# Patient Record
Sex: MALE | Race: WHITE | NOT HISPANIC OR LATINO | Employment: UNEMPLOYED | URBAN - METROPOLITAN AREA
[De-identification: names, ages, dates, MRNs, and addresses within clinical notes are randomized per-mention and may not be internally consistent; named-entity substitution may affect disease eponyms.]

---
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
## 2021-09-18 ENCOUNTER — OFFICE VISIT (OUTPATIENT)
Dept: URGENT CARE | Facility: MEDICAL CENTER | Age: 21
End: 2021-09-18
Payer: COMMERCIAL

## 2021-09-18 VITALS
HEART RATE: 80 BPM | BODY MASS INDEX: 20.16 KG/M2 | DIASTOLIC BLOOD PRESSURE: 69 MMHG | WEIGHT: 144 LBS | HEIGHT: 71 IN | RESPIRATION RATE: 18 BRPM | SYSTOLIC BLOOD PRESSURE: 129 MMHG | OXYGEN SATURATION: 97 % | TEMPERATURE: 98.4 F

## 2021-09-18 DIAGNOSIS — L23.7 POISON IVY: Primary | ICD-10-CM

## 2021-09-18 PROCEDURE — 99213 OFFICE O/P EST LOW 20 MIN: CPT | Performed by: PHYSICIAN ASSISTANT

## 2021-09-18 RX ORDER — PREDNISONE 20 MG/1
40 TABLET ORAL DAILY
Qty: 10 TABLET | Refills: 0 | Status: SHIPPED | OUTPATIENT
Start: 2021-09-18 | End: 2021-09-23

## 2021-09-18 NOTE — PROGRESS NOTES
[facility]        NAME: Aster Wheat is a 22 y o  male  : 2000    MRN: [id number]  DATE: 2021  TIME: 7:30 PM    Assessment and Plan   Poison ivy [L23 7]  1  Poison ivy  predniSONE 20 mg tablet         Patient Instructions     Poison ivy  Prednisone 40 mg daily x 5 days  Follow up with PCP in 3-5 days  Proceed to  ER if symptoms worsen  Chief Complaint     Chief Complaint   Patient presents with    Rash     Began two weeks ago         History of Present Illness       23 y/o male presents c/o itchy rash to upper extremities after working on the yard  States he has tried over the counter medications with no relief      Review of Systems   Review of Systems   Constitutional: Negative  HENT: Negative  Eyes: Negative  Respiratory: Negative  Negative for apnea, cough, choking, chest tightness, shortness of breath, wheezing and stridor  Cardiovascular: Negative  Negative for chest pain  Skin: Positive for rash           Current Medications       Current Outpatient Medications:     citalopram (CeleXA) 10 mg tablet, Take 1 tablet (10 mg total) by mouth daily, Disp: 90 tablet, Rfl: 0    LORazepam (ATIVAN) 0 5 mg tablet, Take 1 tablet (0 5 mg total) by mouth 2 (two) times a day as needed for anxiety, Disp: 30 tablet, Rfl: 0    OLANZapine (ZyPREXA) 2 5 mg tablet, , Disp: , Rfl: 0    ondansetron (ZOFRAN-ODT) 4 mg disintegrating tablet, Take 1 tablet (4 mg total) by mouth every 6 (six) hours as needed for nausea or vomiting, Disp: 20 tablet, Rfl: 0    predniSONE 20 mg tablet, Take 2 tablets (40 mg total) by mouth daily for 5 days, Disp: 10 tablet, Rfl: 0    Current Allergies     Allergies as of 2021    (No Known Allergies)            The following portions of the patient's history were reviewed and updated as appropriate: allergies, current medications, past family history, past medical history, past social history, past surgical history and problem list      Past Medical History:   Diagnosis Date    No known health problems        Past Surgical History:   Procedure Laterality Date    CYST REMOVAL Bilateral     on tailbone       Family History   Problem Relation Age of Onset    Breast cancer Family     Cancer Family         Malignant neoplasm of Gastrointestinal tract    Kidney cancer Family          Medications have been verified  Objective   /69   Pulse 80   Temp 98 4 °F (36 9 °C)   Resp 18   Ht 5' 11" (1 803 m)   Wt 65 3 kg (144 lb)   SpO2 97%   BMI 20 08 kg/m²        Physical Exam     Physical Exam  Constitutional:       General: He is not in acute distress  Appearance: He is well-developed  He is not diaphoretic  Cardiovascular:      Rate and Rhythm: Normal rate and regular rhythm  Heart sounds: Normal heart sounds  Pulmonary:      Effort: Pulmonary effort is normal  No respiratory distress  Breath sounds: Normal breath sounds  No wheezing or rales  Chest:      Chest wall: No tenderness  Musculoskeletal:      Cervical back: Normal range of motion and neck supple  Lymphadenopathy:      Cervical: No cervical adenopathy     Skin:

## 2021-10-18 PROCEDURE — U0005 INFEC AGEN DETEC AMPLI PROBE: HCPCS | Performed by: FAMILY MEDICINE

## 2021-10-18 PROCEDURE — U0003 INFECTIOUS AGENT DETECTION BY NUCLEIC ACID (DNA OR RNA); SEVERE ACUTE RESPIRATORY SYNDROME CORONAVIRUS 2 (SARS-COV-2) (CORONAVIRUS DISEASE [COVID-19]), AMPLIFIED PROBE TECHNIQUE, MAKING USE OF HIGH THROUGHPUT TECHNOLOGIES AS DESCRIBED BY CMS-2020-01-R: HCPCS | Performed by: FAMILY MEDICINE

## 2021-10-20 ENCOUNTER — TELEMEDICINE (OUTPATIENT)
Dept: FAMILY MEDICINE CLINIC | Facility: CLINIC | Age: 21
End: 2021-10-20
Payer: COMMERCIAL

## 2021-10-20 DIAGNOSIS — U07.1 COVID-19: Primary | ICD-10-CM

## 2021-10-20 PROCEDURE — 1036F TOBACCO NON-USER: CPT | Performed by: FAMILY MEDICINE

## 2021-10-20 PROCEDURE — 99213 OFFICE O/P EST LOW 20 MIN: CPT | Performed by: FAMILY MEDICINE

## 2022-09-13 ENCOUNTER — APPOINTMENT (OUTPATIENT)
Dept: LAB | Facility: MEDICAL CENTER | Age: 22
End: 2022-09-13
Payer: MEDICARE

## 2023-01-12 ENCOUNTER — OFFICE VISIT (OUTPATIENT)
Dept: FAMILY MEDICINE CLINIC | Facility: CLINIC | Age: 23
End: 2023-01-12

## 2023-01-12 VITALS
BODY MASS INDEX: 18.15 KG/M2 | OXYGEN SATURATION: 99 % | WEIGHT: 129.6 LBS | SYSTOLIC BLOOD PRESSURE: 130 MMHG | TEMPERATURE: 97.6 F | HEIGHT: 71 IN | HEART RATE: 87 BPM | DIASTOLIC BLOOD PRESSURE: 78 MMHG

## 2023-01-12 DIAGNOSIS — N39.0 URINARY TRACT INFECTION WITHOUT HEMATURIA, SITE UNSPECIFIED: ICD-10-CM

## 2023-01-12 DIAGNOSIS — R30.9 PAINFUL URINATION: Primary | ICD-10-CM

## 2023-01-12 LAB
SL AMB  POCT GLUCOSE, UA: NORMAL
SL AMB LEUKOCYTE ESTERASE,UA: NORMAL
SL AMB POCT BILIRUBIN,UA: NORMAL
SL AMB POCT BLOOD,UA: NORMAL
SL AMB POCT CLARITY,UA: CLEAR
SL AMB POCT COLOR,UA: NORMAL
SL AMB POCT KETONES,UA: NORMAL
SL AMB POCT NITRITE,UA: NORMAL
SL AMB POCT PH,UA: 6
SL AMB POCT SPECIFIC GRAVITY,UA: 1.01
SL AMB POCT URINE PROTEIN: NORMAL
SL AMB POCT UROBILINOGEN: NORMAL

## 2023-01-12 RX ORDER — CIPROFLOXACIN 500 MG/1
500 TABLET, FILM COATED ORAL EVERY 12 HOURS SCHEDULED
Qty: 14 TABLET | Refills: 0 | Status: SHIPPED | OUTPATIENT
Start: 2023-01-12 | End: 2023-01-19

## 2023-01-12 NOTE — PATIENT INSTRUCTIONS
Consider kidney stones, UTI/STD, bladder pathology, sacral issue with new incontinence     Consider scrotal US, pelvic CT, CT stone search

## 2023-01-12 NOTE — PROGRESS NOTES
Assessment/Plan:    1  Painful urination  -     POCT urine dip  -     Urine culture; Future  -     Chlamydia/GC amplified DNA by PCR  -     ciprofloxacin (CIPRO) 500 mg tablet; Take 1 tablet (500 mg total) by mouth every 12 (twelve) hours for 7 days  -     Urine culture          Patient Instructions   Consider kidney stones, UTI/STD, bladder pathology, sacral issue with new incontinence  Consider scrotal US, pelvic CT, CT stone search      No follow-ups on file  Subjective:      Patient ID: Shannon Ray is a 25 y o  male  Chief Complaint   Patient presents with   • Difficulty Urinating       Here for difficulty and painful urination for the last month  He states he was having some incontinent dribbling when he was bending over at work for at least 2 months before the pain and difficulty started  Pain is in his scrotum and tip of the penis, it comes and goes but does not always coincide with urination  Also c/o flank pain bilaterally  Denies unprotected sex or high risk behaviors some low risk but he is a virgin  Denies any SOB, CP  Denies any blood in urine or stool  No family history of kidney stones but dad and brother have autoimmune pancreatitis  No treatments to alleviate symptoms besides gingerale with cranberry  Denies any prolonged hours of not urinating or having to hold it  Denies any long trips  States if he has to go he goes, drinks adequate fluids  The following portions of the patient's history were reviewed and updated as appropriate:  past social history    Review of Systems   Constitutional: Positive for activity change  Negative for fatigue and fever  HENT: Negative  Negative for congestion  Eyes: Negative  Negative for visual disturbance  Respiratory: Negative for cough, chest tightness and shortness of breath  Cardiovascular: Negative for chest pain and palpitations  Gastrointestinal: Positive for abdominal pain (lower umbilcal area)   Negative for blood in stool, diarrhea, nausea and vomiting  Endocrine: Negative  Genitourinary: Positive for difficulty urinating, flank pain, frequency, penile pain and testicular pain  Negative for hematuria and urgency  Musculoskeletal: Negative for arthralgias  Skin: Negative  Allergic/Immunologic: Negative  Neurological: Negative for dizziness, syncope, weakness, numbness and headaches  Hematological: Negative  Does not bruise/bleed easily  Psychiatric/Behavioral: Negative  Current Outpatient Medications   Medication Sig Dispense Refill   • ciprofloxacin (CIPRO) 500 mg tablet Take 1 tablet (500 mg total) by mouth every 12 (twelve) hours for 7 days 14 tablet 0   • citalopram (CeleXA) 10 mg tablet Take 1 tablet (10 mg total) by mouth daily 90 tablet 0   • LORazepam (ATIVAN) 0 5 mg tablet Take 1 tablet (0 5 mg total) by mouth 2 (two) times a day as needed for anxiety 30 tablet 0   • OLANZapine (ZyPREXA) 2 5 mg tablet   0   • ondansetron (ZOFRAN-ODT) 4 mg disintegrating tablet Take 1 tablet (4 mg total) by mouth every 6 (six) hours as needed for nausea or vomiting 20 tablet 0     No current facility-administered medications for this visit  Objective:    /78 (BP Location: Right arm, Patient Position: Sitting, Cuff Size: Standard)   Pulse 87   Temp 97 6 °F (36 4 °C)   Ht 5' 11" (1 803 m)   Wt 58 8 kg (129 lb 9 6 oz)   SpO2 99%   BMI 18 08 kg/m²      Physical Exam  Vitals reviewed  Constitutional:       Appearance: Normal appearance  He is well-developed  He is not ill-appearing  Cardiovascular:      Rate and Rhythm: Normal rate and regular rhythm  Heart sounds: Normal heart sounds  Pulmonary:      Effort: Pulmonary effort is normal       Breath sounds: Normal breath sounds  Abdominal:      General: Abdomen is flat  Bowel sounds are normal  There is no distension  Palpations: Abdomen is soft  There is no mass  Tenderness:  There is abdominal tenderness (lower umbilical area (bladder))  There is no guarding  Hernia: No hernia is present  Skin:     General: Skin is warm and dry  Neurological:      General: No focal deficit present  Mental Status: He is alert and oriented to person, place, and time  Mental status is at baseline  Psychiatric:         Mood and Affect: Mood normal          Behavior: Behavior normal          Thought Content:  Thought content normal          Judgment: Judgment normal              Mike Rodriguez MD

## 2023-01-13 LAB
BACTERIA UR CULT: NORMAL
C TRACH DNA SPEC QL NAA+PROBE: NEGATIVE
N GONORRHOEA DNA SPEC QL NAA+PROBE: NEGATIVE

## 2023-02-17 ENCOUNTER — TELEMEDICINE (OUTPATIENT)
Dept: FAMILY MEDICINE CLINIC | Facility: CLINIC | Age: 23
End: 2023-02-17

## 2023-02-17 VITALS — TEMPERATURE: 98.5 F

## 2023-02-17 DIAGNOSIS — R11.2 NAUSEA AND VOMITING, UNSPECIFIED VOMITING TYPE: Primary | ICD-10-CM

## 2023-02-17 DIAGNOSIS — F41.1 GENERALIZED ANXIETY DISORDER: ICD-10-CM

## 2023-02-17 DIAGNOSIS — R19.7 DIARRHEA, UNSPECIFIED TYPE: ICD-10-CM

## 2023-02-17 PROBLEM — J06.9 VIRAL URI: Status: RESOLVED | Noted: 2018-12-05 | Resolved: 2023-02-17

## 2023-02-17 RX ORDER — FAMOTIDINE 20 MG/1
20 TABLET, FILM COATED ORAL 2 TIMES DAILY
Qty: 30 TABLET | Refills: 1 | Status: SHIPPED | OUTPATIENT
Start: 2023-02-17

## 2023-02-17 RX ORDER — ONDANSETRON 4 MG/1
4 TABLET, FILM COATED ORAL EVERY 8 HOURS PRN
Qty: 20 TABLET | Refills: 0 | Status: SHIPPED | OUTPATIENT
Start: 2023-02-17

## 2023-02-17 NOTE — LETTER
February 17, 2023     Patient: Zunilda Johnson  YOB: 2000  Date of Visit: 2/17/2023      To Whom it May Concern:    Rakesh Peña is under my professional care  Rakesh was seen in my office on 2/17/2023  Rakesh may return to work on 2/18/2023  If you have any questions or concerns, please don't hesitate to call           Sincerely,          Ismael Yu PA-C        CC: No Recipients

## 2023-02-17 NOTE — PROGRESS NOTES
Virtual Regular Visit    Verification of patient location:    Patient is located in the following state in which I hold an active license [place]      Assessment/Plan:    Problem List Items Addressed This Visit        Other    Generalized anxiety disorder   Other Visit Diagnoses     Nausea and vomiting, unspecified vomiting type    -  Primary    Zofran ordered  Also starting famotidine  Discontinue caffeine and nicotine  Relevant Medications    famotidine (PEPCID) 20 mg tablet    ondansetron (ZOFRAN) 4 mg tablet    Diarrhea, unspecified type        Suspect may be related to some IBS               Reason for visit is   Chief Complaint   Patient presents with   • Virtual Regular Visit        Encounter provider Abdon Hull PA-C    Provider located at [address] [place] [address] [facility] [id number] [phone]      Recent Visits  No visits were found meeting these conditions  Showing recent visits within past 7 days and meeting all other requirements  Today's Visits  Date Type Provider Dept   02/17/23 Telemedicine [facility]   Showing today's visits and meeting all other requirements  Future Appointments  No visits were found meeting these conditions  Showing future appointments within next 150 days and meeting all other requirements       The patient was identified by name and date of birth  Rakesh Peña was informed that this is a telemedicine visit and that the visit is being conducted through the [facility]  He agrees to proceed     My office door was closed  No one else was in the room  He acknowledged consent and understanding of privacy and security of the video platform  The patient has agreed to participate and understands they can discontinue the visit at any time  Patient is aware this is a billable service       Nando Alberts is a 25 y o  male       Patient being seen via video visit for recurrent nausea vomiting and diarrhea  States he has no fever or abdominal pain  Had these recurrent symptoms every 2 to 3 weeks for the last couple of months  She states she does smoke nicotine  Not drink alcohol  Does drink a lot of soda  Been trying to cut this down  Appetite is good and he has no weight loss  He has no black stool or blood in the stool  We will prescribe some Zofran for the as needed nausea and vomiting  I am going to start patient on famotidine  Patient may have some reflux  And then triggers some IBS symptoms  This persists he will need referral to gastroenterology       Past Medical History:   Diagnosis Date   • No known health problems        Past Surgical History:   Procedure Laterality Date   • CYST REMOVAL Bilateral     on tailbone       Current Outpatient Medications   Medication Sig Dispense Refill   • famotidine (PEPCID) 20 mg tablet Take 1 tablet (20 mg total) by mouth 2 (two) times a day 30 tablet 1   • ondansetron (ZOFRAN) 4 mg tablet Take 1 tablet (4 mg total) by mouth every 8 (eight) hours as needed for nausea or vomiting 20 tablet 0   • citalopram (CeleXA) 10 mg tablet Take 1 tablet (10 mg total) by mouth daily 90 tablet 0   • LORazepam (ATIVAN) 0 5 mg tablet Take 1 tablet (0 5 mg total) by mouth 2 (two) times a day as needed for anxiety 30 tablet 0   • OLANZapine (ZyPREXA) 2 5 mg tablet   0     No current facility-administered medications for this visit  No Known Allergies    Review of Systems   Constitutional: Negative for activity change, appetite change, fever and unexpected weight change  Gastrointestinal: Positive for diarrhea, nausea and vomiting  Negative for abdominal pain and blood in stool  Video Exam    Vitals:    02/17/23 1132   Temp: 98 5 °F (36 9 °C)       Physical Exam  Constitutional:       General: He is not in acute distress  Appearance: Normal appearance  He is not ill-appearing  HENT:      Head: Normocephalic and atraumatic  Right Ear: External ear normal       Left Ear: External ear normal    Eyes:      Conjunctiva/sclera: Conjunctivae normal    Pulmonary:      Effort: Pulmonary effort is normal    Skin:     Coloration: Skin is not pale  Findings: No erythema  Neurological:      Mental Status: He is alert            I spent 5 3 minutes directly with the patient during this visit

## 2023-03-31 ENCOUNTER — HOSPITAL ENCOUNTER (EMERGENCY)
Facility: HOSPITAL | Age: 23
Discharge: HOME | End: 2023-04-01
Attending: EMERGENCY MEDICINE

## 2023-03-31 ENCOUNTER — APPOINTMENT (EMERGENCY)
Dept: RADIOLOGY | Facility: HOSPITAL | Age: 23
End: 2023-03-31

## 2023-03-31 DIAGNOSIS — R07.89 COSTOCHONDRAL CHEST PAIN: Primary | ICD-10-CM

## 2023-03-31 DIAGNOSIS — F41.1 GENERALIZED ANXIETY DISORDER: ICD-10-CM

## 2023-03-31 DIAGNOSIS — F41.9 ANXIETY: ICD-10-CM

## 2023-03-31 DIAGNOSIS — E87.6 HYPOKALEMIA: ICD-10-CM

## 2023-03-31 DIAGNOSIS — F31.9 BIPOLAR AFFECTIVE DISORDER (HCC): ICD-10-CM

## 2023-03-31 LAB
ALBUMIN SERPL BCP-MCNC: 5 G/DL (ref 3.5–5)
ALP SERPL-CCNC: 45 U/L (ref 34–104)
ALT SERPL W P-5'-P-CCNC: 12 U/L (ref 7–52)
ANION GAP SERPL CALCULATED.3IONS-SCNC: 14 MMOL/L (ref 4–13)
AST SERPL W P-5'-P-CCNC: 14 U/L (ref 13–39)
BASOPHILS # BLD AUTO: 0.04 THOUSANDS/ÂΜL (ref 0–0.1)
BASOPHILS NFR BLD AUTO: 1 % (ref 0–1)
BILIRUB SERPL-MCNC: 0.71 MG/DL (ref 0.2–1)
BUN SERPL-MCNC: 14 MG/DL (ref 5–25)
CALCIUM SERPL-MCNC: 10 MG/DL (ref 8.4–10.2)
CHLORIDE SERPL-SCNC: 101 MMOL/L (ref 96–108)
CO2 SERPL-SCNC: 22 MMOL/L (ref 21–32)
CREAT SERPL-MCNC: 0.9 MG/DL (ref 0.6–1.3)
EOSINOPHIL # BLD AUTO: 0.04 THOUSAND/ÂΜL (ref 0–0.61)
EOSINOPHIL NFR BLD AUTO: 1 % (ref 0–6)
ERYTHROCYTE [DISTWIDTH] IN BLOOD BY AUTOMATED COUNT: 12.3 % (ref 11.6–15.1)
GFR SERPL CREATININE-BSD FRML MDRD: 121 ML/MIN/1.73SQ M
GLUCOSE SERPL-MCNC: 95 MG/DL (ref 65–140)
HCT VFR BLD AUTO: 41 % (ref 36.5–49.3)
HGB BLD-MCNC: 14.2 G/DL (ref 12–17)
IMM GRANULOCYTES # BLD AUTO: 0.03 THOUSAND/UL (ref 0–0.2)
IMM GRANULOCYTES NFR BLD AUTO: 0 % (ref 0–2)
LYMPHOCYTES # BLD AUTO: 2.21 THOUSANDS/ÂΜL (ref 0.6–4.47)
LYMPHOCYTES NFR BLD AUTO: 30 % (ref 14–44)
MCH RBC QN AUTO: 30 PG (ref 26.8–34.3)
MCHC RBC AUTO-ENTMCNC: 34.6 G/DL (ref 31.4–37.4)
MCV RBC AUTO: 87 FL (ref 82–98)
MONOCYTES # BLD AUTO: 0.6 THOUSAND/ÂΜL (ref 0.17–1.22)
MONOCYTES NFR BLD AUTO: 8 % (ref 4–12)
NEUTROPHILS # BLD AUTO: 4.54 THOUSANDS/ÂΜL (ref 1.85–7.62)
NEUTS SEG NFR BLD AUTO: 60 % (ref 43–75)
NRBC BLD AUTO-RTO: 0 /100 WBCS
PLATELET # BLD AUTO: 416 THOUSANDS/UL (ref 149–390)
PMV BLD AUTO: 9 FL (ref 8.9–12.7)
POTASSIUM SERPL-SCNC: 3.2 MMOL/L (ref 3.5–5.3)
PROT SERPL-MCNC: 8 G/DL (ref 6.4–8.4)
RBC # BLD AUTO: 4.73 MILLION/UL (ref 3.88–5.62)
SODIUM SERPL-SCNC: 137 MMOL/L (ref 135–147)
WBC # BLD AUTO: 7.46 THOUSAND/UL (ref 4.31–10.16)

## 2023-03-31 RX ORDER — KETOROLAC TROMETHAMINE 30 MG/ML
15 INJECTION, SOLUTION INTRAMUSCULAR; INTRAVENOUS ONCE
Status: COMPLETED | OUTPATIENT
Start: 2023-03-31 | End: 2023-03-31

## 2023-03-31 RX ORDER — ACETAMINOPHEN 325 MG/1
975 TABLET ORAL ONCE
Status: COMPLETED | OUTPATIENT
Start: 2023-03-31 | End: 2023-03-31

## 2023-03-31 RX ORDER — POTASSIUM CHLORIDE 20 MEQ/1
40 TABLET, EXTENDED RELEASE ORAL ONCE
Status: COMPLETED | OUTPATIENT
Start: 2023-03-31 | End: 2023-03-31

## 2023-03-31 RX ADMIN — ACETAMINOPHEN 975 MG: 325 TABLET ORAL at 23:55

## 2023-03-31 RX ADMIN — KETOROLAC TROMETHAMINE 15 MG: 30 INJECTION, SOLUTION INTRAMUSCULAR at 23:55

## 2023-03-31 RX ADMIN — POTASSIUM CHLORIDE 40 MEQ: 1500 TABLET, EXTENDED RELEASE ORAL at 23:55

## 2023-04-01 VITALS
BODY MASS INDEX: 20.04 KG/M2 | WEIGHT: 140 LBS | HEIGHT: 70 IN | TEMPERATURE: 97.8 F | RESPIRATION RATE: 20 BRPM | DIASTOLIC BLOOD PRESSURE: 67 MMHG | OXYGEN SATURATION: 100 % | HEART RATE: 70 BPM | SYSTOLIC BLOOD PRESSURE: 115 MMHG

## 2023-04-01 LAB
ATRIAL RATE: 92 BPM
CARDIAC TROPONIN I PNL SERPL HS: <2 NG/L
D DIMER PPP FEU-MCNC: <0.27 UG/ML FEU
P AXIS: 74 DEGREES
PR INTERVAL: 126 MS
QRS AXIS: 84 DEGREES
QRSD INTERVAL: 120 MS
QT INTERVAL: 362 MS
QTC INTERVAL: 447 MS
T WAVE AXIS: 76 DEGREES
VENTRICULAR RATE: 92 BPM

## 2023-04-01 RX ORDER — FLUOXETINE 20 MG/1
20 TABLET, FILM COATED ORAL DAILY
Qty: 14 TABLET | Refills: 0 | Status: SHIPPED | OUTPATIENT
Start: 2023-04-01 | End: 2023-04-15

## 2023-04-01 RX ORDER — OLANZAPINE 7.5 MG/1
7.5 TABLET ORAL
Qty: 14 TABLET | Refills: 0 | Status: SHIPPED | OUTPATIENT
Start: 2023-04-01 | End: 2023-04-15

## 2023-04-01 NOTE — ED PROVIDER NOTES
History  Chief Complaint   Patient presents with   • Shortness of Breath     Patient states he is having shortness of breath due to pain on deep inspiration due to chest pain  Patient quit nicotine a week ago, denies any cough  Patient is also facing an increased amount of stress recently  Patient states he is not taking his psych meds for the last two weeks due to inability to have his prescriptions refilled  Rakesh is a 21-year-old male with a history of bipolar affective disorder and generalized anxiety who presents with 5 days of substernal chest pain accompanied by difficulty taking a deep breath and exertional dyspnea  States this started without inciting incident, is waking him from sleep, chest pain is aching, non-radiating, worse with deep inspiration, currently 6/10  Had episode of nausea 2-3 days ago which has not returned after single dose zofran  Feels mildly anxious, however ran out of olanzapine and fluoxetine 2 weeks ago, is unable to get in touch with the [facility] clinic to get refills  Quit smoking 1 week ago after smoking for 5 years  No alcohol or recreational drug use  Denies fever, chills, weakness, lightheadedness, URI symptoms, cough, abdominal pain, vomiting diarrhea, constipation, urinary symptoms, peripheral edema, unilateral calf pain or swelling, rash, fatigue, SI/HI, A/V hallucinations, depressed mood, recent travel or sick contacts, or personal or family history of cardiac problems or DVT/PE  No history of asthma  Prior to Admission Medications   Prescriptions Last Dose Informant Patient Reported? Taking?    LORazepam (ATIVAN) 0 5 mg tablet   No No   Sig: Take 1 tablet (0 5 mg total) by mouth 2 (two) times a day as needed for anxiety   famotidine (PEPCID) 20 mg tablet   No No   Sig: Take 1 tablet (20 mg total) by mouth 2 (two) times a day   ondansetron (ZOFRAN) 4 mg tablet   No No   Sig: Take 1 tablet (4 mg total) by mouth every 8 (eight) hours as needed for nausea or vomiting      Facility-Administered Medications: None       Past Medical History:   Diagnosis Date   • No known health problems        Past Surgical History:   Procedure Laterality Date   • CYST REMOVAL Bilateral     on tailbone       Family History   Problem Relation Age of Onset   • Breast cancer Family    • Cancer Family         Malignant neoplasm of Gastrointestinal tract   • Kidney cancer Family      I have reviewed and agree with the history as documented  E-Cigarette/Vaping     E-Cigarette/Vaping Substances   • Nicotine No    • THC No    • CBD No    • Flavoring No    • Other No    • Unknown No      Social History     Tobacco Use   • Smoking status: Never   • Smokeless tobacco: Never   Substance Use Topics   • Alcohol use: No   • Drug use: No        Review of Systems   Constitutional: Negative  Negative for activity change, appetite change, chills, diaphoresis, fatigue, fever and unexpected weight change  HENT: Negative  Eyes: Negative  Negative for visual disturbance  Respiratory: Positive for chest tightness and shortness of breath  Negative for cough  Cardiovascular: Positive for chest pain  Negative for palpitations and leg swelling  Gastrointestinal: Negative  Negative for abdominal distention, abdominal pain, constipation, diarrhea, nausea and vomiting  Endocrine: Negative  Negative for cold intolerance, heat intolerance, polydipsia and polyuria  Genitourinary: Negative  Negative for decreased urine volume, dysuria, flank pain and hematuria  Musculoskeletal: Negative  Negative for arthralgias, back pain, myalgias and neck pain  Allergic/Immunologic: Negative  Negative for immunocompromised state  Neurological: Negative  Negative for dizziness, facial asymmetry, weakness, light-headedness and headaches  Hematological: Negative  Negative for adenopathy  Does not bruise/bleed easily     Psychiatric/Behavioral: Negative for agitation, confusion, decreased concentration, hallucinations, self-injury, sleep disturbance and suicidal ideas  The patient is nervous/anxious  All other systems reviewed and are negative  Physical Exam  ED Triage Vitals [03/31/23 2204]   Temperature Pulse Respirations Blood Pressure SpO2   97 8 °F (36 6 °C) 89 (!) 24 130/72 100 %      Temp Source Heart Rate Source Patient Position - Orthostatic VS BP Location FiO2 (%)   Oral Monitor Sitting Right arm --      Pain Score       --             Orthostatic Vital Signs  Vitals:    03/31/23 2204 03/31/23 2300 04/01/23 0143   BP: 130/72 113/68 115/67   Pulse: 89 71 70   Patient Position - Orthostatic VS: Sitting Lying        Physical Exam  Vitals and nursing note reviewed  Constitutional:       General: He is not in acute distress  Appearance: Normal appearance  He is not diaphoretic  HENT:      Head: Normocephalic and atraumatic  Nose: Nose normal  No congestion or rhinorrhea  Mouth/Throat:      Mouth: Mucous membranes are moist       Pharynx: Oropharynx is clear  No oropharyngeal exudate or posterior oropharyngeal erythema  Eyes:      General:         Right eye: No discharge  Left eye: No discharge  Extraocular Movements: Extraocular movements intact  Conjunctiva/sclera: Conjunctivae normal    Cardiovascular:      Rate and Rhythm: Normal rate and regular rhythm  Pulses: Normal pulses  Heart sounds: Normal heart sounds  No murmur heard  No friction rub  No gallop  Pulmonary:      Effort: Pulmonary effort is normal  Tachypnea present  No accessory muscle usage, prolonged expiration, respiratory distress or retractions  Breath sounds: Normal breath sounds  No decreased air movement or transmitted upper airway sounds  No decreased breath sounds, wheezing, rhonchi or rales  Chest:      Chest wall: No tenderness  Abdominal:      General: Abdomen is flat  Bowel sounds are normal  There is no distension  Palpations: Abdomen is soft        Tenderness: There is no abdominal tenderness  There is no right CVA tenderness, left CVA tenderness, guarding or rebound  Musculoskeletal:         General: No swelling or tenderness  Normal range of motion  Cervical back: Normal range of motion and neck supple  Right lower leg: No edema  Left lower leg: No edema  Comments: No calf tenderness, swelling, or erythema bilaterally  Lymphadenopathy:      Cervical: No cervical adenopathy  Skin:     General: Skin is warm and dry  Capillary Refill: Capillary refill takes less than 2 seconds  Coloration: Skin is not pale  Findings: No bruising or rash  Neurological:      General: No focal deficit present  Mental Status: He is alert and oriented to person, place, and time  Motor: No weakness     Psychiatric:         Mood and Affect: Mood normal          Behavior: Behavior normal          ED Medications  Medications   potassium chloride (K-DUR,KLOR-CON) CR tablet 40 mEq (40 mEq Oral Given 3/31/23 2355)   ketorolac (TORADOL) injection 15 mg (15 mg Intravenous Given 3/31/23 2355)   acetaminophen (TYLENOL) tablet 975 mg (975 mg Oral Given 3/31/23 2355)       Diagnostic Studies  Results Reviewed     Procedure Component Value Units Date/Time    HS Troponin 0hr (reflex protocol) [[id number]]  (Normal) Collected: 03/31/23 2327    Lab Status: Final result Specimen: Blood from Arm, Right Updated: 04/01/23 0006     hs TnI 0hr <2 ng/L     D-dimer, quantitative [[id number]]  (Normal) Collected: 03/31/23 2327    Lab Status: Final result Specimen: Blood from Arm, Right Updated: 04/01/23 0002     D-Dimer, Quant <0 27 ug/ml [facility]     Comprehensive metabolic panel [[id number]]  (Abnormal) Collected: 03/31/23 2224    Lab Status: Final result Specimen: Blood from Arm, Right Updated: 03/31/23 2257     Sodium 137 mmol/L      Potassium 3 2 mmol/L      Chloride 101 mmol/L      CO2 22 mmol/L      ANION GAP 14 mmol/L      BUN 14 mg/dL      Creatinine 0 90 mg/dL Glucose 95 mg/dL      Calcium 10 0 mg/dL      AST 14 U/L      ALT 12 U/L      Alkaline Phosphatase 45 U/L      Total Protein 8 0 g/dL      Albumin 5 0 g/dL      Total Bilirubin 0 71 mg/dL      eGFR 121 ml/min/1 73sq m     Narrative:      National Kidney Disease Foundation guidelines for Chronic Kidney Disease (CKD):   •  Stage 1 with normal or high GFR (GFR > 90 mL/min/1 73 square meters)  •  Stage 2 Mild CKD (GFR = 60-89 mL/min/1 73 square meters)  •  Stage 3A Moderate CKD (GFR = 45-59 mL/min/1 73 square meters)  •  Stage 3B Moderate CKD (GFR = 30-44 mL/min/1 73 square meters)  •  Stage 4 Severe CKD (GFR = 15-29 mL/min/1 73 square meters)  •  Stage 5 End Stage CKD (GFR <15 mL/min/1 73 square meters)  Note: GFR calculation is accurate only with a steady state creatinine    CBC and differential [[id number]]  (Abnormal) Collected: 03/31/23 2224    Lab Status: Final result Specimen: Blood from Arm, Right Updated: 03/31/23 2236     WBC 7 46 Thousand/uL      RBC 4 73 Million/uL      Hemoglobin 14 2 g/dL      Hematocrit 41 0 %      MCV 87 fL      MCH 30 0 pg      MCHC 34 6 g/dL      RDW 12 3 %      MPV 9 0 fL      Platelets 836 Thousands/uL      nRBC 0 /100 WBCs      Neutrophils Relative 60 %      Immat GRANS % 0 %      Lymphocytes Relative 30 %      Monocytes Relative 8 %      Eosinophils Relative 1 %      Basophils Relative 1 %      Neutrophils Absolute 4 54 Thousands/µL      Immature Grans Absolute 0 03 Thousand/uL      Lymphocytes Absolute 2 21 Thousands/µL      Monocytes Absolute 0 60 Thousand/µL      Eosinophils Absolute 0 04 Thousand/µL      Basophils Absolute 0 04 Thousands/µL                  XR chest 2 views   ED Interpretation by Lucia Solo DO (04/01 0008)   No acute cardiopulmonary findings              Procedures  ECG 12 Lead Documentation Only    Date/Time: 3/31/2023 10:11 PM  Performed by: Lucia Solo DO  Authorized by: Lucia Solo DO     Indications / Diagnosis:  Chest pain  ECG reviewed by me, the ED Provider: yes    Patient location:  ED  Interpretation:     Interpretation: non-specific    Rate:     ECG rate:  92    ECG rate assessment: normal    Rhythm:     Rhythm: sinus rhythm    Ectopy:     Ectopy: none    QRS:     QRS axis:  Normal    QRS intervals:  Normal  Conduction:     Conduction: normal    ST segments:     ST segments:  Normal  T waves:     T waves: inverted      Inverted:  AVL and V2          ED Course  ED Course as of 04/01/23 0446   Fri Mar 31, 2023   2343 Rakesh is a 25 yom who presents with chest pain and shortness of breath x 5 days, feels anxious but denies any other accompanying symptoms  Recently ran out of olanzapine and fluoxetine  SpO2 100% at rest, 93% on ambulation, is mildly tachpnic with rate 22 on my exam, BP stable, unable to reproduce chest pain with palpation, LS CTA bilaterally, abd soft/nontender, cap refill <2 seconds, equal bilateral pulses, no evidence of peripheral edema or DVT on exam   CBC WNL, CBC with mild hypokalemia(will replete orally), will obtain d dimer given Well's 3, troponin, CXR,  ECG NSR, rate 92, with T wave inversions in aVL and V2, no ST segment changes or abnormal intervals, no prior for comparison  Will give toradol and tylenol for pain  Likely MSK related vs anxiety related, however cannot exclude PE, ACS, pneumothorax, or pneumonia  Sat Apr 01, 2023   0006 hs TnI 0hr: <2  WNL  Given onset of chest pain 5 days ago, doubt ACS  Will cancel 2 and 4-hour repeats  0007 D-Dimer, Quant: <0 27  Doubt PE    0008 XR chest 2 views  No acute cardiopulmonary findings as per my interpretation  0109 Pt has somewhat improved pain after toradol  Discussed normal test results(with exception to potassium)  Likely MSK pain combined with anxiety secondary to sudden stoppage of fluoxetine and olanzapine    Discussed supportive care measures, will write short Rx for olanzapine and fluoxetine given patient able to prove dosages and Rx history  Strict return precautions discussed  Patient to follow-up with PCP this week  HEART Risk Score    Flowsheet Row Most Recent Value   Heart Score Risk Calculator    History 0 Filed at: 04/01/2023 0114   ECG 1 Filed at: 04/01/2023 0114   Age 0 Filed at: 04/01/2023 0114   Risk Factors 0 Filed at: 04/01/2023 0114   Troponin 0 Filed at: 04/01/2023 0114   HEART Score 1 Filed at: 04/01/2023 0114                          Wells' Criteria for PE    Flowsheet Row Most Recent Value   Wells' Criteria for PE    Clinical signs and symptoms of DVT 0 Filed at: 03/31/2023 2320   PE is primary diagnosis or equally likely 3 Filed at: 03/31/2023 2320   HR >100 0 Filed at: 03/31/2023 2320   Immobilization at least 3 days or Surgery in the previous 4 weeks 0 Filed at: 03/31/2023 2320   Previous, objectively diagnosed PE or DVT 0 Filed at: 03/31/2023 2320   Hemoptysis 0 Filed at: 03/31/2023 2320   Malignancy with treatment within 6 months or palliative 0 Filed at: 03/31/2023 2320   Wells' Criteria Total 3 Filed at: 03/31/2023 2320            Medical Decision Making  See ED course for MDM  Amount and/or Complexity of Data Reviewed  Labs: ordered  Decision-making details documented in ED Course  Radiology: ordered and independent interpretation performed  Decision-making details documented in ED Course  Risk  OTC drugs  Prescription drug management              Disposition  Final diagnoses:   Costochondral chest pain   Anxiety   Bipolar affective disorder ([facility] )   Generalized anxiety disorder   Hypokalemia     Time reflects when diagnosis was documented in both MDM as applicable and the Disposition within this note     Time User Action Codes Description Comment    4/1/2023  1:11 AM Randall Senna Add [R07 89] Costochondral chest pain     4/1/2023  1:12 AM Randall Senna Add [F41 9] Anxiety     4/1/2023  1:13 AM Randall Senna Add [F31 9] Bipolar affective disorder ([facility] )     4/1/2023  1:13 AM Mardeen Rafy, Pari Duet Add [F41 1] Generalized anxiety disorder     4/1/2023  4:46 AM Vahid Calvillo Add [E87 6] Hypokalemia       ED Disposition     ED Disposition   Discharge    Condition   Stable    Date/Time   Sat Apr 1, 2023  1:11 AM    Comment   Rakesh Peña discharge to home/self care  Follow-up Information     Follow up With Specialties Details Why Contact Info    Esau Andrade MD Family Medicine   [address] [place] [address] [address]  [phone]            Discharge Medication List as of 4/1/2023  1:15 AM      START taking these medications    Details   FLUoxetine (PROzac) 20 MG tablet Take 1 tablet (20 mg total) by mouth daily for 14 days, Starting Sat 4/1/2023, Until Sat 4/15/2023, Normal      OLANZapine (ZyPREXA) 7 5 mg tablet Take 1 tablet (7 5 mg total) by mouth daily at bedtime for 14 days, Starting Sat 4/1/2023, Until Sat 4/15/2023, Normal         CONTINUE these medications which have NOT CHANGED    Details   famotidine (PEPCID) 20 mg tablet Take 1 tablet (20 mg total) by mouth 2 (two) times a day, Starting Fri 2/17/2023, Normal      LORazepam (ATIVAN) 0 5 mg tablet Take 1 tablet (0 5 mg total) by mouth 2 (two) times a day as needed for anxiety, Starting Thu 8/1/2019, Normal      ondansetron (ZOFRAN) 4 mg tablet Take 1 tablet (4 mg total) by mouth every 8 (eight) hours as needed for nausea or vomiting, Starting Fri 2/17/2023, Normal           No discharge procedures on file  PDMP Review       Value Time User    PDMP Reviewed  Yes 3/31/2023 11:14 PM Herminio Frankel, MD           ED Provider  Attending physically available and evaluated Rakesh Peña I managed the patient along with the ED Attending      Electronically Signed by         Hero Vieyra DO  04/01/23 8891

## 2023-04-01 NOTE — ED ATTENDING ATTESTATION
3/31/2023  IAndrei MD, saw and evaluated the patient  I have discussed the patient with the resident/non-physician practitioner and agree with the resident's/non-physician practitioner's findings, Plan of Care, and MDM as documented in the resident's/non-physician practitioner's note, except where noted  All available labs and Radiology studies were reviewed  I was present for key portions of any procedure(s) performed by the resident/non-physician practitioner and I was immediately available to provide assistance  At this point I agree with the current assessment done in the Emergency Department  I have conducted an independent evaluation of this patient a history and physical is as follows: Patient is a 25year old male with 5 days of pleuritic chest pain and sob  No cough  No fever  No N/V  No travel  (+) smokes  (+) increased stress  Has been without his psychiatric medications for past 2 weeks  No SI or HI  Was last seen in this ED on 9/28/14 for concussion  [facility] website checked on this patient and last Rx filled was on 1/24/22 for ativan for 30 day supply  NCAT  Moist mucous membranes  Lungs clear  Mild tachypnea  Heart regular without murmur  Abdomen soft and nontender  Good bowel sounds  No edema  Normal affect  Neuro intact  DDX including but not limited to: chest wall pain, pleurisy, costochondritis, pericarditis, myocarditis, PTX, PE; doubt pneumonia, GI etiology; doubt ACS or MI or dissection or rhabdomyolysis  I reviewed labs and EKG  Will check CXR       ED Course         Critical Care Time  Procedures

## 2023-04-01 NOTE — DISCHARGE INSTRUCTIONS
Please make an appointment with your family care provider this week for medication refills and to follow-up for your symptoms today

## 2023-06-27 ENCOUNTER — TELEPHONE (OUTPATIENT)
Dept: FAMILY MEDICINE CLINIC | Facility: CLINIC | Age: 23
End: 2023-06-27

## 2023-06-27 NOTE — TELEPHONE ENCOUNTER
Patient would like to know if you would be able to take over his antidepressant medications  It is a hassle to get it from her current doctor  Please advise

## 2023-06-28 ENCOUNTER — OFFICE VISIT (OUTPATIENT)
Dept: FAMILY MEDICINE CLINIC | Facility: CLINIC | Age: 23
End: 2023-06-28
Payer: MEDICARE

## 2023-06-28 VITALS
HEIGHT: 70 IN | OXYGEN SATURATION: 98 % | TEMPERATURE: 97.6 F | SYSTOLIC BLOOD PRESSURE: 122 MMHG | DIASTOLIC BLOOD PRESSURE: 74 MMHG | HEART RATE: 87 BPM | RESPIRATION RATE: 16 BRPM | BODY MASS INDEX: 20.04 KG/M2 | WEIGHT: 140 LBS

## 2023-06-28 DIAGNOSIS — J06.9 UPPER RESPIRATORY TRACT INFECTION, UNSPECIFIED TYPE: Primary | ICD-10-CM

## 2023-06-28 PROCEDURE — 99214 OFFICE O/P EST MOD 30 MIN: CPT | Performed by: FAMILY MEDICINE

## 2023-06-28 RX ORDER — LORATADINE 10 MG/1
10 TABLET ORAL DAILY
Qty: 30 TABLET | Refills: 0 | Status: SHIPPED | OUTPATIENT
Start: 2023-06-28

## 2023-06-28 NOTE — PROGRESS NOTES
Name: Jake Beatty      : 2000      MRN: [id number]  Encounter Provider: Duane Arreaga MD  Encounter Date: 2023   Encounter department: [address] [place] [address]  Upper respiratory tract infection, unspecified type  -     loratadine (CLARITIN) 10 mg tablet; Take 1 tablet (10 mg total) by mouth daily      Ear pain radiating down bilateral neck likely secondary to irritation of the eustachian tube  Symptoms consistent with mild allergic/viral respiratory infection/inflammation  Continue to avoid using vaping nicotine products as this may worsen symptoms  We will have patient try Claritin with Tylenol  Patient may also try Benadryl half a tablet at nighttime to help with sleep and for decongestion         Subjective     HPI     20-year-old male patient presents for evaluation of bilateral ear pain radiating down his neck  Patient reports symptom has been ongoing for about a month and causing some occasional lightheadedness and dizziness  Denies any recent illness but does have some sore throat and congestion  Patient denies any recent air travel or submerging his head underwater  Denies any discharge from the ear  Patient does have muffled hearing  Patient has tried putting Q-tips in his ear without any improvement in his symptoms  Review of Systems   Constitutional: Negative for chills and fever  HENT: Positive for congestion, ear pain, postnasal drip, rhinorrhea and sore throat  Negative for ear discharge  Respiratory: Negative for chest tightness and shortness of breath  Recently stopped using nicotine (vaping)   Cardiovascular: Negative for chest pain  Gastrointestinal: Negative for abdominal pain  Neurological: Negative for dizziness, light-headedness and headaches         Past Medical History:   Diagnosis Date   • No known health problems      Past Surgical History:   Procedure Laterality Date   • CYST REMOVAL Bilateral     on tailbone Family History   Problem Relation Age of Onset   • Cancer Mother         thyroid   • No Known Problems Father    • Breast cancer Family    • Cancer Family         Malignant neoplasm of Gastrointestinal tract   • Kidney cancer Family      Social History     Socioeconomic History   • Marital status: Single     Spouse name: None   • Number of children: None   • Years of education: None   • Highest education level: None   Occupational History   • None   Tobacco Use   • Smoking status: Never   • Smokeless tobacco: Never   Vaping Use   • Vaping Use: Former   • Substances: Nicotine, Flavoring   Substance and Sexual Activity   • Alcohol use: No   • Drug use: No   • Sexual activity: None   Other Topics Concern   • None   Social History Narrative    Always uses seatbelts     Social Determinants of Health     Financial Resource Strain: Not on file   Food Insecurity: Not on file   Transportation Needs: Not on file   Physical Activity: Not on file   Stress: Not on file   Social Connections: Not on file   Intimate Partner Violence: Not on file   Housing Stability: Not on file     Current Outpatient Medications on File Prior to Visit   Medication Sig   • famotidine (PEPCID) 20 mg tablet Take 1 tablet (20 mg total) by mouth 2 (two) times a day   • FLUoxetine (PROzac) 20 MG tablet Take 1 tablet (20 mg total) by mouth daily for 14 days   • LORazepam (ATIVAN) 0 5 mg tablet Take 1 tablet (0 5 mg total) by mouth 2 (two) times a day as needed for anxiety   • OLANZapine (ZyPREXA) 7 5 mg tablet Take 1 tablet (7 5 mg total) by mouth daily at bedtime for 14 days   • ondansetron (ZOFRAN) 4 mg tablet Take 1 tablet (4 mg total) by mouth every 8 (eight) hours as needed for nausea or vomiting (Patient not taking: Reported on 6/28/2023)     No Known Allergies  Immunization History   Administered Date(s) Administered   • DTP 08/09/2002, 09/23/2002, 01/07/2003, 06/10/2003, 08/22/2005   • Hep B, adult 2001, 09/02/2002, 06/10/2003   • Hib "(PRP-OMP) 2001, 08/09/2002, 09/23/2002   • IPV 2001, 09/02/2002, 01/07/2003, 08/22/2005   • MMR 08/09/2002, 08/22/2005   • Meningococcal, Unknown Serogroups 11/25/2013   • Pneumococcal Conjugate PCV 7 2001, 09/23/2002, 01/07/2003   • Tdap 11/25/2013   • Varicella 09/06/2005, 09/01/2011       Objective     /74 (BP Location: Left arm, Patient Position: Sitting, Cuff Size: Standard)   Pulse 87   Temp 97 6 °F (36 4 °C) (Temporal)   Resp 16   Ht 5' 10\" (1 778 m)   Wt 63 5 kg (140 lb)   SpO2 98%   BMI 20 09 kg/m²     Physical Exam  Vitals reviewed  Constitutional:       General: He is not in acute distress  Appearance: Normal appearance  He is not ill-appearing, toxic-appearing or diaphoretic  HENT:      Right Ear: Tympanic membrane, ear canal and external ear normal  There is no impacted cerumen  Left Ear: Tympanic membrane, ear canal and external ear normal  There is no impacted cerumen  Nose: Congestion present  Mouth/Throat:      Mouth: Mucous membranes are moist       Comments: Post nasal drip noted  Cardiovascular:      Rate and Rhythm: Normal rate and regular rhythm  Pulses: Normal pulses  Heart sounds: Normal heart sounds  No murmur heard  Pulmonary:      Effort: Pulmonary effort is normal  No respiratory distress  Breath sounds: Normal breath sounds  Abdominal:      General: Abdomen is flat  Bowel sounds are normal  There is no distension  Palpations: Abdomen is soft  Musculoskeletal:         General: No swelling or deformity  Skin:     General: Skin is warm and dry  Capillary Refill: Capillary refill takes less than 2 seconds  Coloration: Skin is not jaundiced  Neurological:      General: No focal deficit present  Mental Status: He is alert and oriented to person, place, and time     Psychiatric:         Mood and Affect: Mood normal             Cornell Schreiber MD  "

## 2023-06-28 NOTE — PATIENT INSTRUCTIONS
Take Claritin 10 mg once a day  Take Tylenol 500 mg up to 3 times a day  Optional: Claritin half a tablet to a whole tablet at nighttime to help with sleep and congestion symptoms  Flonase for nasal drip, sore throat, congestion

## 2023-08-09 ENCOUNTER — APPOINTMENT (OUTPATIENT)
Dept: RADIOLOGY | Facility: MEDICAL CENTER | Age: 23
End: 2023-08-09
Payer: COMMERCIAL

## 2023-08-09 ENCOUNTER — OFFICE VISIT (OUTPATIENT)
Dept: FAMILY MEDICINE CLINIC | Facility: CLINIC | Age: 23
End: 2023-08-09
Payer: MEDICARE

## 2023-08-09 VITALS
OXYGEN SATURATION: 98 % | SYSTOLIC BLOOD PRESSURE: 124 MMHG | DIASTOLIC BLOOD PRESSURE: 78 MMHG | TEMPERATURE: 97.8 F | BODY MASS INDEX: 20.13 KG/M2 | HEIGHT: 70 IN | HEART RATE: 82 BPM | WEIGHT: 140.6 LBS

## 2023-08-09 DIAGNOSIS — R07.89 CHEST TIGHTNESS: ICD-10-CM

## 2023-08-09 DIAGNOSIS — R06.89 ABNORMAL BREATH SOUNDS: ICD-10-CM

## 2023-08-09 DIAGNOSIS — R07.89 CHEST TIGHTNESS: Primary | ICD-10-CM

## 2023-08-09 DIAGNOSIS — J45.909 REACTIVE AIRWAY DISEASE WITHOUT COMPLICATION, UNSPECIFIED ASTHMA SEVERITY, UNSPECIFIED WHETHER PERSISTENT: ICD-10-CM

## 2023-08-09 PROCEDURE — 71046 X-RAY EXAM CHEST 2 VIEWS: CPT

## 2023-08-09 PROCEDURE — 99214 OFFICE O/P EST MOD 30 MIN: CPT | Performed by: FAMILY MEDICINE

## 2023-08-09 RX ORDER — AZITHROMYCIN 250 MG/1
TABLET, FILM COATED ORAL
Qty: 6 TABLET | Refills: 0 | Status: SHIPPED | OUTPATIENT
Start: 2023-08-09 | End: 2023-08-14

## 2023-08-09 RX ORDER — PREDNISONE 20 MG/1
40 TABLET ORAL DAILY
Qty: 10 TABLET | Refills: 0 | Status: SHIPPED | OUTPATIENT
Start: 2023-08-09 | End: 2023-08-14

## 2023-08-09 NOTE — PROGRESS NOTES
Outpatient Progress Note    Assessment/Plan:    Problem List Items Addressed This Visit        Other    Chest tightness - Primary    Relevant Medications    azithromycin (Zithromax) 250 mg tablet    predniSONE 20 mg tablet    Other Relevant Orders    XR chest pa & lateral    Abnormal breath sounds    Relevant Medications    azithromycin (Zithromax) 250 mg tablet    predniSONE 20 mg tablet    Other Relevant Orders    XR chest pa & lateral   Other Visit Diagnoses     Reactive airway disease without complication, unspecified asthma severity, unspecified whether persistent        Relevant Medications    azithromycin (Zithromax) 250 mg tablet    predniSONE 20 mg tablet         Patient is concerned symptoms of chest tightness, chest congestion, feeling tightness and soreness on the neck. Denies any difficulty taking a deep breath. Unclear etiology, may be secondary to reactive pneumonitis from CBD/smoking  Will start patient on short course of oral steroid, prednisone 40 mg for total 5 days for treatment, patient may also use azithromycin for total 5 days. Chest x-ray for evaluation    Disposition:     I have spent a total time of 10 minutes on the day of the encounter for this patient including       Encounter provider: Ruben Ganser, MD     Provider located at: [address]  [phone]     Recent Visits  No visits were found meeting these conditions. Showing recent visits within past 7 days and meeting all other requirements  Today's Visits  Date Type Provider Dept   08/09/23 Office Visit Ruben Ganser, MD  [facility]    Showing today's visits and meeting all other requirements  Future Appointments  No visits were found meeting these conditions. Showing future appointments within next 150 days and meeting all other requirements     Subjective:   Rakesh Og is a 26 y.o. male who is concerned about COVID-19.  Patient's symptoms include fatigue, malaise, nasal congestion (chest congestion), rhinorrhea, sore throat (neck and throat feels sore), cough and chest tightness. Patient denies fever, chills, anosmia, loss of taste, shortness of breath, abdominal pain, nausea, vomiting, diarrhea, myalgias and headaches. - Date of symptom onset: 8/4/2023      COVID-19 vaccination status: Not vaccinated    Exposure:   Contact with a person who is under investigation (PUI) for or who is positive for COVID-19 within the last 14 days?: No    Hospitalized recently for fever and/or lower respiratory symptoms?: No      Currently a healthcare worker that is involved in direct patient care?: No      Works in a special setting where the risk of COVID-19 transmission may be high? (this may include long-term care, correctional and senior living facilities; homeless shelters; assisted-living facilities and group homes.): No      Resident in a special setting where the risk of COVID-19 transmission may be high? (this may include long-term care, correctional and senior living facilities; homeless shelters; assisted-living facilities and group homes.): No      Feels like I have to yawn a lot to get a deep breath   Has been experiencing nausea, without vomiting  Reports he has anxiety has been worse recently, has been smoking CBD to help with anxiety symptom, believes this may be also contributing to his respiratory symptoms. Lab Results   Component Value Date    SARSCOV2 Positive (A) 10/18/2021    SARSCOV2 Not Detected 11/16/2020       Review of Systems   Constitutional: Positive for fatigue. Negative for chills and fever. HENT: Positive for congestion (chest congestion), rhinorrhea and sore throat (neck and throat feels sore). Respiratory: Positive for cough and chest tightness. Negative for shortness of breath. Gastrointestinal: Negative for abdominal pain, diarrhea, nausea and vomiting. Musculoskeletal: Negative for myalgias.    Neurological: Negative for headaches. Current Outpatient Medications on File Prior to Visit   Medication Sig   • LORazepam (ATIVAN) 0.5 mg tablet Take 1 tablet (0.5 mg total) by mouth 2 (two) times a day as needed for anxiety   • OLANZapine (ZyPREXA) 7.5 mg tablet Take 1 tablet (7.5 mg total) by mouth daily at bedtime for 14 days   • famotidine (PEPCID) 20 mg tablet Take 1 tablet (20 mg total) by mouth 2 (two) times a day (Patient not taking: Reported on 8/9/2023)   • FLUoxetine (PROzac) 20 MG tablet Take 1 tablet (20 mg total) by mouth daily for 14 days   • loratadine (CLARITIN) 10 mg tablet Take 1 tablet (10 mg total) by mouth daily (Patient not taking: Reported on 8/9/2023)   • ondansetron (ZOFRAN) 4 mg tablet Take 1 tablet (4 mg total) by mouth every 8 (eight) hours as needed for nausea or vomiting (Patient not taking: Reported on 6/28/2023)       Objective:    /78 (BP Location: Right arm, Patient Position: Sitting, Cuff Size: Standard)   Pulse 82   Temp 97.8 °F (36.6 °C)   Ht 5' 10" (1.778 m)   Wt 63.8 kg (140 lb 9.6 oz)   SpO2 98%   BMI 20.17 kg/m²        Physical Exam  Vitals reviewed. Constitutional:       Appearance: Normal appearance. Cardiovascular:      Rate and Rhythm: Normal rate and regular rhythm. Pulses: Normal pulses. Pulmonary:      Effort: Pulmonary effort is normal.      Comments: Muffled breath sounds on bilateral lower bases  Abdominal:      General: Abdomen is flat. Musculoskeletal:         General: No swelling or deformity. Skin:     General: Skin is warm. Capillary Refill: Capillary refill takes less than 2 seconds. Neurological:      General: No focal deficit present. Mental Status: He is alert.    Psychiatric:         Mood and Affect: Mood normal.       Harman Reza MD

## 2023-09-06 ENCOUNTER — OFFICE VISIT (OUTPATIENT)
Dept: FAMILY MEDICINE CLINIC | Facility: CLINIC | Age: 23
End: 2023-09-06
Payer: COMMERCIAL

## 2023-09-06 VITALS
SYSTOLIC BLOOD PRESSURE: 124 MMHG | DIASTOLIC BLOOD PRESSURE: 82 MMHG | WEIGHT: 146 LBS | OXYGEN SATURATION: 99 % | BODY MASS INDEX: 20.9 KG/M2 | HEIGHT: 70 IN | TEMPERATURE: 97.9 F | RESPIRATION RATE: 16 BRPM | HEART RATE: 81 BPM

## 2023-09-06 DIAGNOSIS — R07.89 CHEST TIGHTNESS: Primary | ICD-10-CM

## 2023-09-06 DIAGNOSIS — J45.909 REACTIVE AIRWAY DISEASE WITHOUT COMPLICATION, UNSPECIFIED ASTHMA SEVERITY, UNSPECIFIED WHETHER PERSISTENT: ICD-10-CM

## 2023-09-06 DIAGNOSIS — R07.89 CHEST TIGHTNESS: ICD-10-CM

## 2023-09-06 PROCEDURE — 99214 OFFICE O/P EST MOD 30 MIN: CPT | Performed by: FAMILY MEDICINE

## 2023-09-06 RX ORDER — FLUOXETINE HYDROCHLORIDE 20 MG/1
20 CAPSULE ORAL DAILY
COMMUNITY
Start: 2023-09-05

## 2023-09-06 RX ORDER — FLUTICASONE PROPIONATE AND SALMETEROL 100; 50 UG/1; UG/1
1 POWDER RESPIRATORY (INHALATION) 2 TIMES DAILY
Qty: 60 BLISTER | Refills: 0 | Status: SHIPPED | OUTPATIENT
Start: 2023-09-06 | End: 2023-09-07

## 2023-09-06 RX ORDER — ALBUTEROL SULFATE 90 UG/1
2 AEROSOL, METERED RESPIRATORY (INHALATION) EVERY 6 HOURS PRN
Qty: 6.7 G | Refills: 1 | Status: SHIPPED | OUTPATIENT
Start: 2023-09-06

## 2023-09-06 NOTE — PROGRESS NOTES
Name: Sudhakar Camargo      : 2000      MRN: [id number]  Encounter Provider: Jesus Knott MD  Encounter Date: 2023   Encounter department: [facility]     1. Chest tightness  -     Fluticasone-Salmeterol (Advair Diskus) 100-50 mcg/dose inhaler; Inhale 1 puff 2 (two) times a day Rinse mouth after use. -     albuterol (Ventolin HFA) 90 mcg/act inhaler; Inhale 2 puffs every 6 (six) hours as needed for wheezing or shortness of breath  -     Ambulatory Referral to Pulmonology; Future    2. Reactive airway disease without complication, unspecified asthma severity, unspecified whether persistent  -     Fluticasone-Salmeterol (Advair Diskus) 100-50 mcg/dose inhaler; Inhale 1 puff 2 (two) times a day Rinse mouth after use. -     albuterol (Ventolin HFA) 90 mcg/act inhaler; Inhale 2 puffs every 6 (six) hours as needed for wheezing or shortness of breath  -     Ambulatory Referral to Pulmonology; Future      Patient continues to have chest tightness after completing prednisone and azithromycin, x-ray does not demonstrate any significant abnormality, as patient does have a history of tobacco and vaping use, concern for possible reactive pulmonary disease/restrictive pulmonary etiology, will start patient with treatment with inhaled corticosteroid and albuterol. If there is no significant improvement symptom patient may benefit from evaluation by pulmonology, will provide referral at this time, will have patient follow-up in 6 weeks to discuss results of new medication, also follow-up annual physical         Subjective     HPI     Patient here for follow-up regarding his appointment on 2023. Patient was seen for chest tightness, concern for reactive pneumonitis and was started on short course of steroid and azithromycin.   Patient reports while on the antibiotic and steroid he has significant symptomatic relief however about 2 days after completing medication his symptoms returned. Patient does not feel like there is any improvement in his overall symptoms  X-ray at that time does not demonstrate any significant pathological features. Pt states his chest feels very heavy, can't seem to get a full deep breath. Pt does have some feeling of tightness in the throat area.   "feels like I have to yawn". Has stopped vapeing and smoking. Review of Systems   Constitutional: Negative for chills and fever. HENT: Negative for congestion, rhinorrhea and sore throat (throat tightness). Respiratory: Positive for cough (dry cough), chest tightness and shortness of breath. Negative for wheezing and stridor. Cardiovascular: Negative for chest pain. Gastrointestinal: Negative for abdominal pain. Musculoskeletal: Positive for back pain (with deep breathing). Neurological: Negative for headaches.        Past Medical History:   Diagnosis Date   • No known health problems      Past Surgical History:   Procedure Laterality Date   • CYST REMOVAL Bilateral     on tailbone     Family History   Problem Relation Age of Onset   • Cancer Mother         thyroid   • No Known Problems Father    • Breast cancer Family    • Cancer Family         Malignant neoplasm of Gastrointestinal tract   • Kidney cancer Family      Social History     Socioeconomic History   • Marital status: Single     Spouse name: None   • Number of children: None   • Years of education: None   • Highest education level: None   Occupational History   • None   Tobacco Use   • Smoking status: Never   • Smokeless tobacco: Never   Vaping Use   • Vaping Use: Former   • Substances: Nicotine, Flavoring   Substance and Sexual Activity   • Alcohol use: No   • Drug use: Not Currently     Types: Marijuana   • Sexual activity: None   Other Topics Concern   • None   Social History Narrative    Always uses seatbelts     Social Determinants of Health     Financial Resource Strain: Not on file   Food Insecurity: Not on file Transportation Needs: Not on file   Physical Activity: Not on file   Stress: Not on file   Social Connections: Not on file   Intimate Partner Violence: Not on file   Housing Stability: Not on file     Current Outpatient Medications on File Prior to Visit   Medication Sig   • FLUoxetine (PROzac) 20 mg capsule Take 20 mg by mouth in the morning   • LORazepam (ATIVAN) 0.5 mg tablet Take 1 tablet (0.5 mg total) by mouth 2 (two) times a day as needed for anxiety   • OLANZapine (ZyPREXA) 7.5 mg tablet Take 1 tablet (7.5 mg total) by mouth daily at bedtime for 14 days   • famotidine (PEPCID) 20 mg tablet Take 1 tablet (20 mg total) by mouth 2 (two) times a day (Patient not taking: Reported on 8/9/2023)   • FLUoxetine (PROzac) 20 MG tablet Take 1 tablet (20 mg total) by mouth daily for 14 days (Patient not taking: Reported on 9/6/2023)   • loratadine (CLARITIN) 10 mg tablet Take 1 tablet (10 mg total) by mouth daily (Patient not taking: Reported on 8/9/2023)   • ondansetron (ZOFRAN) 4 mg tablet Take 1 tablet (4 mg total) by mouth every 8 (eight) hours as needed for nausea or vomiting (Patient not taking: Reported on 6/28/2023)     No Known Allergies  Immunization History   Administered Date(s) Administered   • DTP 08/09/2002, 09/23/2002, 01/07/2003, 06/10/2003, 08/22/2005   • Hep B, adult 2001, 09/02/2002, 06/10/2003   • Hib (PRP-OMP) 2001, 08/09/2002, 09/23/2002   • IPV 2001, 09/02/2002, 01/07/2003, 08/22/2005   • MMR 08/09/2002, 08/22/2005   • Meningococcal, Unknown Serogroups 11/25/2013   • Pneumococcal Conjugate PCV 7 2001, 09/23/2002, 01/07/2003   • Tdap 11/25/2013   • Varicella 09/06/2005, 09/01/2011       Objective     /82 (BP Location: Left arm, Patient Position: Sitting, Cuff Size: Large)   Pulse 81   Temp 97.9 °F (36.6 °C) (Temporal)   Resp 16   Ht 5' 10" (1.778 m)   Wt 66.2 kg (146 lb)   SpO2 99%   BMI 20.95 kg/m²     Physical Exam  Vitals reviewed.    Constitutional: Appearance: Normal appearance. Cardiovascular:      Rate and Rhythm: Normal rate and regular rhythm. Pulses: Normal pulses. Heart sounds: Normal heart sounds. No murmur heard. Pulmonary:      Effort: Pulmonary effort is normal. No respiratory distress. Breath sounds: Normal breath sounds. Abdominal:      General: Abdomen is flat. Bowel sounds are normal. There is no distension. Palpations: Abdomen is soft. Musculoskeletal:         General: No swelling. Normal range of motion. Skin:     General: Skin is warm. Capillary Refill: Capillary refill takes less than 2 seconds. Neurological:      General: No focal deficit present. Mental Status: He is alert.    Psychiatric:         Mood and Affect: Mood normal.          Emelia Mason MD

## 2023-09-07 RX ORDER — FLUTICASONE PROPIONATE AND SALMETEROL 50; 100 UG/1; UG/1
1 POWDER RESPIRATORY (INHALATION) 2 TIMES DAILY
Qty: 60 BLISTER | Refills: 0 | Status: SHIPPED | OUTPATIENT
Start: 2023-09-07

## 2023-09-26 ENCOUNTER — OFFICE VISIT (OUTPATIENT)
Dept: FAMILY MEDICINE CLINIC | Facility: CLINIC | Age: 23
End: 2023-09-26
Payer: COMMERCIAL

## 2023-09-26 VITALS
WEIGHT: 145.4 LBS | HEIGHT: 70 IN | HEART RATE: 88 BPM | BODY MASS INDEX: 20.81 KG/M2 | SYSTOLIC BLOOD PRESSURE: 122 MMHG | OXYGEN SATURATION: 99 % | TEMPERATURE: 98.1 F | DIASTOLIC BLOOD PRESSURE: 82 MMHG

## 2023-09-26 DIAGNOSIS — A08.4 VIRAL GASTROENTERITIS: Primary | ICD-10-CM

## 2023-09-26 PROCEDURE — 99213 OFFICE O/P EST LOW 20 MIN: CPT | Performed by: PHYSICIAN ASSISTANT

## 2023-09-26 NOTE — PROGRESS NOTES
Assessment/Plan:     Diagnoses and all orders for this visit:    Viral gastroenteritis  Comments:  Continue supportive care. Increase fluid intake, plain BRAT diet. Subjective:      Patient ID: Marlene Real is a 26 y.o. male. Here for diarrhea for the last 5 days. On 9/21 had to go to the bathroom around 15 times, noticed stool was dark and black a few times. Has seen some mucus, and has a foul smell, denies blood in stool. Has had abdominal cramping and a decreased appetite. C/o nausea, congestion, scratchy throat, and headache since 9/22. He has been able to continue working. Denies any fever or vomiting. Has not gone camping or had any recent travel. Mother, sister, and father have all had similar symptoms for the last week. He tried ibuprofen for the abdominal cramps and headache, with no relief. Has not tried any other OTC treatments. Has been drinking more fluids, Gatorade and ginger ale.        The following portions of the patient's history were reviewed and updated as appropriate:   He   Patient Active Problem List    Diagnosis Date Noted   • Reactive airway disease without complication 44/53/0630   • Chest tightness 08/09/2023   • Abnormal breath sounds 08/09/2023   • Bipolar affective disorder ([address]) 10/25/2017   • Generalized anxiety disorder 10/25/2017     Current Outpatient Medications   Medication Sig Dispense Refill   • Advair Diskus 100-50 MCG/ACT inhaler INHALE 1 PUFF BY MOUTH AND INTO THE LUNGS TWICE A DAY RINSE MOUTH AFTER USE 60 blister 0   • albuterol (Ventolin HFA) 90 mcg/act inhaler Inhale 2 puffs every 6 (six) hours as needed for wheezing or shortness of breath 6.7 g 1   • FLUoxetine (PROzac) 20 mg capsule Take 20 mg by mouth in the morning     • LORazepam (ATIVAN) 0.5 mg tablet Take 1 tablet (0.5 mg total) by mouth 2 (two) times a day as needed for anxiety 30 tablet 0   • OLANZapine (ZyPREXA) 7.5 mg tablet Take 1 tablet (7.5 mg total) by mouth daily at bedtime for 14 days 14 tablet 0   • famotidine (PEPCID) 20 mg tablet Take 1 tablet (20 mg total) by mouth 2 (two) times a day (Patient not taking: Reported on 8/9/2023) 30 tablet 1   • FLUoxetine (PROzac) 20 MG tablet Take 1 tablet (20 mg total) by mouth daily for 14 days (Patient not taking: Reported on 9/6/2023) 14 tablet 0   • loratadine (CLARITIN) 10 mg tablet Take 1 tablet (10 mg total) by mouth daily (Patient not taking: Reported on 8/9/2023) 30 tablet 0   • ondansetron (ZOFRAN) 4 mg tablet Take 1 tablet (4 mg total) by mouth every 8 (eight) hours as needed for nausea or vomiting (Patient not taking: Reported on 6/28/2023) 20 tablet 0     No current facility-administered medications for this visit. He has No Known Allergies. .    Review of Systems   Constitutional: Positive for appetite change (decreased). Negative for activity change, chills, fatigue and fever. HENT: Positive for congestion, rhinorrhea and sore throat. Negative for trouble swallowing. Respiratory: Negative for cough and chest tightness. Gastrointestinal: Positive for abdominal pain, diarrhea and nausea. Negative for blood in stool, constipation, rectal pain and vomiting. Genitourinary: Negative for difficulty urinating. Neurological: Positive for headaches. Psychiatric/Behavioral: Negative for sleep disturbance. Objective:        Physical Exam  Constitutional:       General: He is not in acute distress. Appearance: Normal appearance. He is normal weight. He is not toxic-appearing. HENT:      Head: Normocephalic and atraumatic. Right Ear: Tympanic membrane, ear canal and external ear normal.      Left Ear: Tympanic membrane, ear canal and external ear normal.      Nose: Congestion present. Comments: Slight maxillary tenderness, L side     Mouth/Throat:      Pharynx: Oropharynx is clear. No oropharyngeal exudate or posterior oropharyngeal erythema.    Eyes:      Conjunctiva/sclera: Conjunctivae normal.      Pupils: Pupils are equal, round, and reactive to light. Cardiovascular:      Rate and Rhythm: Normal rate and regular rhythm. Heart sounds: Normal heart sounds. No murmur heard. Pulmonary:      Effort: Pulmonary effort is normal. No respiratory distress. Breath sounds: Normal breath sounds. No wheezing. Abdominal:      General: Abdomen is flat. Bowel sounds are normal. There is no distension. Palpations: Abdomen is soft. Tenderness: There is abdominal tenderness (diffuse, worse in epigastric and umbilical regions). There is no guarding or rebound. Musculoskeletal:      Cervical back: No tenderness. Right lower leg: No edema. Left lower leg: No edema. Lymphadenopathy:      Cervical: No cervical adenopathy. Skin:     General: Skin is warm and dry. Coloration: Skin is not pale. Findings: No erythema. Neurological:      General: No focal deficit present. Mental Status: He is alert and oriented to person, place, and time. Psychiatric:         Mood and Affect: Mood normal.         Behavior: Behavior normal.         Thought Content:  Thought content normal.         Judgment: Judgment normal.

## 2023-09-26 NOTE — LETTER
September 26, 2023     Patient: Taylor Roman  YOB: 2000  Date of Visit: 9/26/2023      To Whom it May Concern:    Yury Jesus is under my professional care. Rakesh was seen in my office on 9/26/2023. Rakesh may return to work on   .9/28/2023    If you have any questions or concerns, please don't hesitate to call.          Sincerely,          Babs Angulo PA-C        CC: No Recipients

## 2023-10-19 ENCOUNTER — NURSE TRIAGE (OUTPATIENT)
Age: 23
End: 2023-10-19

## 2023-10-19 NOTE — TELEPHONE ENCOUNTER
Regarding: hip pain  ----- Message from Noe Hillman sent at 10/19/2023  1:24 PM EDT -----  " Patient was schedule for today have to cancel the appointment due no transportation.  Patient has numbness and hip pain "

## 2023-10-19 NOTE — TELEPHONE ENCOUNTER
Triaged call for numbness and tingling in both legs and pain x 1 week now. Symptoms are constant and rates pain a 6-7. II recommended evaluation in office or UC. Patient states he has work. My recommendation remains UC evaluation within 3 days.

## 2023-10-19 NOTE — TELEPHONE ENCOUNTER
Reason for Disposition  • Numbness or tingling on both sides of body and is a new symptom lasting > 24 hours    Answer Assessment - Initial Assessment Questions  1. SYMPTOM: "What is the main symptom you are concerned about?" (e.g., weakness, numbness)      Numbness in both legs     2. ONSET: "When did this start?" (minutes, hours, days; while sleeping)    1 week ago       3. LAST NORMAL: "When was the last time you were normal (no symptoms)?"         1 week ago     4. PATTERN "Does this come and go, or has it been constant since it started?"  "Is it present now?"         Constant     5. CARDIAC SYMPTOMS: "Have you had any of the following symptoms: chest pain, difficulty breathing, palpitations?"         Denies     6. NEUROLOGIC SYMPTOMS: "Have you had any of the following symptoms: headache, dizziness, vision loss, double vision, changes in speech, unsteady on your feet?"      denies  Legs feel strong when lsyig down legs feel weak and tingly whn up they are sore       7.  OTHER SYMPTOMS: "Do you have any other symptoms?"    Protocols used: Neurologic Deficit-ADULT-[place]

## 2023-10-22 ENCOUNTER — NURSE TRIAGE (OUTPATIENT)
Dept: OTHER | Facility: OTHER | Age: 23
End: 2023-10-22

## 2023-10-22 NOTE — TELEPHONE ENCOUNTER
Regarding: Leg pain/ numbness  ----- Message from Abdirashid Moore sent at 10/22/2023  8:54 AM EDT -----  " I have had pain and numbness is my legs ever since I was squatting about a week ago"

## 2023-10-22 NOTE — TELEPHONE ENCOUNTER
Reason for Disposition   Numbness in a leg or foot (i.e., loss of sensation)    Answer Assessment - Initial Assessment Questions  1. ONSET: "When did the pain start?"       For one week. 2. LOCATION: "Where is the pain located?"       Both legs    3. PAIN: "How bad is the pain?"    (Scale 1-10; or mild, moderate, severe)    -  MILD (1-3): doesn't interfere with normal activities     -  MODERATE (4-7): interferes with normal activities (e.g., work or school) or awakens from sleep, limping     -  SEVERE (8-10): excruciating pain, unable to do any normal activities, unable to walk      Shooting pain in knees and up to hips. 7 or 8 / 10. Tylenol 650 PRN and Icy Hot. Has not tried ibuprofen. States pain in right leg is a bit worse- feels tingling/numbing sensation. Can still walk and use full range of both legs is just having this off and on pain. 4. WORK OR EXERCISE: "Has there been any recent work or exercise that involved this part of the body?"       One week ago was doing one legged squats. 5. CAUSE: "What do you think is causing the leg pain?"      Strain/ pulled muscles during exercise. 6. OTHER SYMPTOMS: "Do you have any other symptoms?" (e.g., chest pain, back pain, breathing difficulty, swelling, rash, fever, numbness, weakness)      No visible injury or redness. No fevers. No other symptoms. Protocols used: Leg Pain-ADULT-      An appointment has been scheduled for tomorrow at 3:45 pm with Dr. Jenna Lepe.

## 2023-11-08 ENCOUNTER — OFFICE VISIT (OUTPATIENT)
Dept: FAMILY MEDICINE CLINIC | Facility: CLINIC | Age: 23
End: 2023-11-08
Payer: COMMERCIAL

## 2023-11-08 ENCOUNTER — APPOINTMENT (OUTPATIENT)
Dept: RADIOLOGY | Facility: MEDICAL CENTER | Age: 23
End: 2023-11-08
Payer: COMMERCIAL

## 2023-11-08 VITALS
DIASTOLIC BLOOD PRESSURE: 78 MMHG | BODY MASS INDEX: 21.19 KG/M2 | HEART RATE: 94 BPM | OXYGEN SATURATION: 99 % | TEMPERATURE: 98.3 F | HEIGHT: 70 IN | WEIGHT: 148 LBS | SYSTOLIC BLOOD PRESSURE: 126 MMHG

## 2023-11-08 DIAGNOSIS — M54.16 LUMBAR BACK PAIN WITH RADICULOPATHY AFFECTING LOWER EXTREMITY: Primary | ICD-10-CM

## 2023-11-08 DIAGNOSIS — M54.16 LUMBAR BACK PAIN WITH RADICULOPATHY AFFECTING LOWER EXTREMITY: ICD-10-CM

## 2023-11-08 PROCEDURE — 72110 X-RAY EXAM L-2 SPINE 4/>VWS: CPT

## 2023-11-08 PROCEDURE — 99214 OFFICE O/P EST MOD 30 MIN: CPT | Performed by: FAMILY MEDICINE

## 2023-11-08 RX ORDER — PREDNISONE 20 MG/1
40 TABLET ORAL DAILY
Qty: 10 TABLET | Refills: 0 | Status: SHIPPED | OUTPATIENT
Start: 2023-11-08 | End: 2023-11-13

## 2023-11-08 NOTE — PROGRESS NOTES
Name: Raymundo Samaniego      : 2000      MRN: [id number]  Encounter Provider: Checo Deluna MD  Encounter Date: 2023   Encounter department: [facility]     1. Lumbar back pain with radiculopathy affecting lower extremity  -     XR spine lumbar minimum 4 views non injury; Future; Expected date: 2023  -     predniSONE 20 mg tablet; Take 2 tablets (40 mg total) by mouth daily for 5 days      Concern for lumbar radiculopathy due to mechanism of injury as well as numbness and tingling in bilateral extremity, patient did have some positive findings on straight leg raise test today. We will start patient on prednisone 40 mg for total of 5 days, followed by Tylenol 500 mg 2 tablets twice a day for the next 2 weeks. Anticipate recovery within 4 to 6 weeks, if there is no significant improvement please return for evaluation may consider further radiographic work-up versus physical therapy at that time. Patient may also have come comment gluteal strain. Subjective     HPI    26-year-old male patient presents for evaluation regarding bilateral lower extremity pain. Patient reports symptoms started around 4 weeks ago when the pain started patient was doing a 1 legged squat. No significant improvement of symptoms since then. Symptoms bilateral, shooting from the lower back into the rest of the lower extremity with numbness and sharp pain. Full extension of the hip and lower extremity makes the pain worse. Denies any Synnamon improvement with over-the-counter NSAIDs. Patient reports the pain is "okay" but does have shooting pain over the shin area, 6-7 out of 10. Ache in the thighs and stretchy feeling in the knee      Review of Systems   Constitutional:  Negative for chills and fever. HENT:  Negative for congestion, rhinorrhea and sore throat. Respiratory:  Negative for chest tightness and shortness of breath.     Gastrointestinal:  Negative for abdominal pain. Musculoskeletal:  Positive for arthralgias and back pain. Neurological:  Positive for numbness. Negative for dizziness, weakness, light-headedness and headaches. Psychiatric/Behavioral:  Negative for sleep disturbance.         Past Medical History:   Diagnosis Date    No known health problems      Past Surgical History:   Procedure Laterality Date    CYST REMOVAL Bilateral     on tailbone     Family History   Problem Relation Age of Onset    Cancer Mother         thyroid    No Known Problems Father     Breast cancer Family     Cancer Family         Malignant neoplasm of Gastrointestinal tract    Kidney cancer Family      Social History     Socioeconomic History    Marital status: Single     Spouse name: None    Number of children: None    Years of education: None    Highest education level: None   Occupational History    None   Tobacco Use    Smoking status: Never    Smokeless tobacco: Never   Vaping Use    Vaping Use: Former    Substances: Nicotine, Flavoring   Substance and Sexual Activity    Alcohol use: No    Drug use: Not Currently     Types: Marijuana    Sexual activity: None   Other Topics Concern    None   Social History Narrative    Always uses seatbelts     Social Determinants of Health     Financial Resource Strain: Not on file   Food Insecurity: Not on file   Transportation Needs: Not on file   Physical Activity: Not on file   Stress: Not on file   Social Connections: Not on file   Intimate Partner Violence: Not on file   Housing Stability: Not on file     Current Outpatient Medications on File Prior to Visit   Medication Sig    Advair Diskus 100-50 MCG/ACT inhaler INHALE 1 PUFF BY MOUTH AND INTO THE LUNGS TWICE A DAY RINSE MOUTH AFTER USE    albuterol (Ventolin HFA) 90 mcg/act inhaler Inhale 2 puffs every 6 (six) hours as needed for wheezing or shortness of breath    FLUoxetine (PROzac) 20 mg capsule Take 20 mg by mouth in the morning    LORazepam (ATIVAN) 0.5 mg tablet Take 1 tablet (0.5 mg total) by mouth 2 (two) times a day as needed for anxiety    OLANZapine (ZyPREXA) 7.5 mg tablet Take 1 tablet (7.5 mg total) by mouth daily at bedtime for 14 days    famotidine (PEPCID) 20 mg tablet Take 1 tablet (20 mg total) by mouth 2 (two) times a day    FLUoxetine (PROzac) 20 MG tablet Take 1 tablet (20 mg total) by mouth daily for 14 days (Patient not taking: Reported on 9/6/2023)    loratadine (CLARITIN) 10 mg tablet Take 1 tablet (10 mg total) by mouth daily    ondansetron (ZOFRAN) 4 mg tablet Take 1 tablet (4 mg total) by mouth every 8 (eight) hours as needed for nausea or vomiting     No Known Allergies  Immunization History   Administered Date(s) Administered    DTP 08/09/2002, 09/23/2002, 01/07/2003, 06/10/2003, 08/22/2005    Hep B, adult 2000, 09/02/2002, 06/10/2003    Hib (PRP-OMP) 2000, 08/09/2002, 09/23/2002    IPV 2001, 09/02/2002, 01/07/2003, 08/22/2005    MMR 08/09/2002, 08/22/2005    Meningococcal, Unknown Serogroups 11/25/2013    Pneumococcal Conjugate PCV 7 2001, 09/23/2002, 01/07/2003    Tdap 11/25/2013    Varicella 09/06/2005, 09/01/2011       Objective     /78 (BP Location: Left arm, Patient Position: Sitting, Cuff Size: Standard)   Pulse 94   Temp 98.3 °F (36.8 °C)   Ht 5' 10" (1.778 m)   Wt 67.1 kg (148 lb)   SpO2 99%   BMI 21.24 kg/m²     Physical Exam  Vitals reviewed. Constitutional:       General: He is not in acute distress. Appearance: Normal appearance. He is not ill-appearing, toxic-appearing or diaphoretic. Cardiovascular:      Rate and Rhythm: Normal rate and regular rhythm. Pulses: Normal pulses. Heart sounds: Normal heart sounds. No murmur heard. Pulmonary:      Effort: Pulmonary effort is normal. No respiratory distress. Breath sounds: Normal breath sounds. Abdominal:      General: Abdomen is flat. Bowel sounds are normal. There is no distension. Palpations: Abdomen is soft.    Musculoskeletal: General: Tenderness present. No swelling or deformity. Comments: Some pain with straight leg raise test, negative logroll, pain with FADIR test, negative LUCIA test     Skin:     General: Skin is warm and dry. Capillary Refill: Capillary refill takes less than 2 seconds. Coloration: Skin is not jaundiced. Neurological:      General: No focal deficit present. Mental Status: He is alert.    Psychiatric:         Mood and Affect: Mood normal.           Juju Cooper MD

## 2023-11-17 ENCOUNTER — TELEPHONE (OUTPATIENT)
Age: 23
End: 2023-11-17

## 2023-11-17 NOTE — TELEPHONE ENCOUNTER
Patient called in regards to his back pain. He said he took the medicine that was prescribed for 5 days and it did not help with his pain. He wanted another appt, appt made for 11/21/23.

## 2023-12-12 ENCOUNTER — OFFICE VISIT (OUTPATIENT)
Dept: FAMILY MEDICINE CLINIC | Facility: CLINIC | Age: 23
End: 2023-12-12
Payer: COMMERCIAL

## 2023-12-12 VITALS
OXYGEN SATURATION: 97 % | BODY MASS INDEX: 22.25 KG/M2 | SYSTOLIC BLOOD PRESSURE: 120 MMHG | HEIGHT: 70 IN | TEMPERATURE: 98.2 F | DIASTOLIC BLOOD PRESSURE: 72 MMHG | WEIGHT: 155.4 LBS | HEART RATE: 90 BPM

## 2023-12-12 DIAGNOSIS — M54.16 LUMBAR BACK PAIN WITH RADICULOPATHY AFFECTING LOWER EXTREMITY: ICD-10-CM

## 2023-12-12 DIAGNOSIS — Z00.00 ANNUAL PHYSICAL EXAM: Primary | ICD-10-CM

## 2023-12-12 DIAGNOSIS — M25.551 BILATERAL HIP PAIN: ICD-10-CM

## 2023-12-12 DIAGNOSIS — M25.552 BILATERAL HIP PAIN: ICD-10-CM

## 2023-12-12 PROCEDURE — 99214 OFFICE O/P EST MOD 30 MIN: CPT | Performed by: FAMILY MEDICINE

## 2023-12-12 PROCEDURE — 99395 PREV VISIT EST AGE 18-39: CPT | Performed by: FAMILY MEDICINE

## 2023-12-12 RX ORDER — CYCLOBENZAPRINE HCL 5 MG
2.5-5 TABLET ORAL
Qty: 30 TABLET | Refills: 0 | Status: SHIPPED | OUTPATIENT
Start: 2023-12-12

## 2023-12-12 NOTE — PROGRESS NOTES
[address]    NAME: Ruddy Arreaga  AGE: 26 y.o. SEX: male  : 2000     DATE: 2023     Assessment and Plan:     Problem List Items Addressed This Visit    None  Visit Diagnoses       Annual physical exam    -  Primary    Lumbar back pain with radiculopathy affecting lower extremity        Relevant Medications    cyclobenzaprine (FLEXERIL) 5 mg tablet    Other Relevant Orders    Ambulatory Referral to Sports Medicine    Sedimentation rate, automated    C-reactive protein    Comprehensive metabolic panel    CBC and differential    Ambulatory Referral to Physical Therapy    Bilateral hip pain        Relevant Medications    cyclobenzaprine (FLEXERIL) 5 mg tablet    Other Relevant Orders    Ambulatory Referral to Sports Medicine    Sedimentation rate, automated    C-reactive protein    Comprehensive metabolic panel    CBC and differential    Ambulatory Referral to Physical Therapy          Worsening lumbar back pain compared to her evaluation last months  X-ray does not demonstrate any significant osseous abnormality  Weakness in bilateral leg makes it concern for possible neurological etiology however there is no documented foraminal stenosis  Patient did not improve with steroid, reports the pain to be a stretching sensation, we will try a short course of muscle relaxant to take in the evening to help with pain and improved sleep  Refer patient to sports medicine and physical therapy for evaluation  blood work including CBC, CMP, CRP and sed rate for evaluation of inflammation/myositis      Immunizations and preventive care screenings were discussed with patient today. Appropriate education was printed on patient's after visit summary. Counseling:  Alcohol/drug use: discussed moderation in alcohol intake, the recommendations for healthy alcohol use, and avoidance of illicit drug use.   Dental Health: discussed importance of regular tooth brushing, flossing, and dental visits. Injury prevention: discussed safety/seat belts, safety helmets, smoke detectors, carbon dioxide detectors, and smoking near bedding or upholstery. Sexual health: discussed sexually transmitted diseases, partner selection, use of condoms, avoidance of unintended pregnancy, and contraceptive alternatives. Exercise: the importance of regular exercise/physical activity was discussed. Recommend exercise 3-5 times per week for at least 30 minutes. No follow-ups on file. Chief Complaint:     Chief Complaint   Patient presents with    Annual Exam      History of Present Illness:     Adult Annual Physical   Patient here for a comprehensive physical exam. The patient reports: Worsening hip and lumbar back pain with pain radiating down his leg even more and radiating up his back. See noted from 11/8/23 for detail, started with a one legged squat. Patient describes the pain as a stretching/pulling sensation. Reports simple activities such as going up stairs causes soreness in his legs and sensation of fatigue in his muscle. No significant improvement after steroid, prednisone 40 mg for total 5 days. Patient has noticed that with some exercise he was prescribed, he has difficulty with elevating legs while lying down. At a neural sitting position, patient report back pain is the worst, 7-8/10. Patient reports walking helps with the back pain, however his hip pain seems to be worse with walking. No significant abnormal finding on x-ray of the lumbar spine completed on 10/8/2023    Pt has been using ibuprofen daily without significant improvement in his symptoms. No recent change in medications. Asthma symptom stable. Pt no longer taking prozac. Pt does not recall when the medication was stopped. Pt continues to be on zyprexa. Diet and Physical Activity  Diet/Nutrition: poor diet and eats mostly gas station food . Exercise: 3-4 times a week on average and 30-60 minutes on average. Depression Screening  PHQ-2/9 Depression Screening    Little interest or pleasure in doing things: 3 - nearly every day  Feeling down, depressed, or hopeless: 3 - nearly every day  Trouble falling or staying asleep, or sleeping too much: 3 - nearly every day  Feeling tired or having little energy: 3 - nearly every day  Poor appetite or overeating: 3 - nearly every day  Feeling bad about yourself - or that you are a failure or have let yourself or your family down: 3 - nearly every day  Trouble concentrating on things, such as reading the newspaper or watching television: 3 - nearly every day  Moving or speaking so slowly that other people could have noticed. Or the opposite - being so fidgety or restless that you have been moving around a lot more than usual: 1 - several days  Thoughts that you would be better off dead, or of hurting yourself in some way: 0 - not at all  PHQ-2 Score: 6  PHQ-2 Interpretation: POSITIVE depression screen  PHQ-9 Score: 22   PHQ-9 Interpretation: Severe depression          PHQ-2 positive due to pain     General Health  Sleep:  difficulty sleeping due to pain . When lying down pain in the hip and legs bilaterally. Hearing: normal - bilateral.  Vision: no vision problems. Dental: no dental visits for >1 year and brushes teeth twice daily.  Health  History of STDs?: no.    Advanced Care Planning  Do you have an advanced directive? no  Do you have a durable medical power of ? no     Review of Systems:     Review of Systems   Constitutional:  Negative for chills and fever. HENT:  Negative for congestion, rhinorrhea and sore throat. URI symptoms 1 week ago. "I get sick very frequently"    Respiratory:  Negative for chest tightness and shortness of breath. Cardiovascular:  Negative for chest pain. Gastrointestinal:  Negative for abdominal pain.    Musculoskeletal:  Positive for arthralgias, back pain (start from lumbar spine, radiates up to the neck, down to the legs) and neck pain. Neurological:  Positive for weakness and numbness (improving, bilateral legs). Negative for dizziness, light-headedness and headaches. Psychiatric/Behavioral:  Positive for sleep disturbance.            Past Medical History:     Past Medical History:   Diagnosis Date    No known health problems       Past Surgical History:     Past Surgical History:   Procedure Laterality Date    CYST REMOVAL Bilateral     on tailbone      Social History:     Social History     Socioeconomic History    Marital status: Single     Spouse name: Not on file    Number of children: Not on file    Years of education: Not on file    Highest education level: Not on file   Occupational History    Not on file   Tobacco Use    Smoking status: Never    Smokeless tobacco: Never   Vaping Use    Vaping Use: Former    Substances: Nicotine, Flavoring   Substance and Sexual Activity    Alcohol use: No    Drug use: Not Currently     Types: Marijuana    Sexual activity: Not on file   Other Topics Concern    Not on file   Social History Narrative    Always uses seatbelts     Social Determinants of Health     Financial Resource Strain: Not on file   Food Insecurity: Not on file   Transportation Needs: Not on file   Physical Activity: Not on file   Stress: Not on file   Social Connections: Not on file   Intimate Partner Violence: Not on file   Housing Stability: Not on file      Family History:     Family History   Problem Relation Age of Onset    Cancer Mother         thyroid    No Known Problems Father     Breast cancer Family     Cancer Family         Malignant neoplasm of Gastrointestinal tract    Kidney cancer Family       Current Medications:     Current Outpatient Medications   Medication Sig Dispense Refill    Advair Diskus 100-50 MCG/ACT inhaler INHALE 1 PUFF BY MOUTH AND INTO THE LUNGS TWICE A DAY RINSE MOUTH AFTER USE 60 blister 0 albuterol (Ventolin HFA) 90 mcg/act inhaler Inhale 2 puffs every 6 (six) hours as needed for wheezing or shortness of breath 6.7 g 1    cyclobenzaprine (FLEXERIL) 5 mg tablet Take 0.5-1 tablets (2.5-5 mg total) by mouth daily at bedtime 30 tablet 0    famotidine (PEPCID) 20 mg tablet Take 1 tablet (20 mg total) by mouth 2 (two) times a day 30 tablet 1    FLUoxetine (PROzac) 20 mg capsule Take 20 mg by mouth in the morning      loratadine (CLARITIN) 10 mg tablet Take 1 tablet (10 mg total) by mouth daily 30 tablet 0    LORazepam (ATIVAN) 0.5 mg tablet Take 1 tablet (0.5 mg total) by mouth 2 (two) times a day as needed for anxiety 30 tablet 0    OLANZapine (ZyPREXA) 7.5 mg tablet Take 1 tablet (7.5 mg total) by mouth daily at bedtime for 14 days 14 tablet 0    ondansetron (ZOFRAN) 4 mg tablet Take 1 tablet (4 mg total) by mouth every 8 (eight) hours as needed for nausea or vomiting 20 tablet 0    FLUoxetine (PROzac) 20 MG tablet Take 1 tablet (20 mg total) by mouth daily for 14 days (Patient not taking: Reported on 9/6/2023) 14 tablet 0     No current facility-administered medications for this visit. Allergies:     No Known Allergies   Physical Exam:     /72 (BP Location: Left arm, Patient Position: Sitting, Cuff Size: Standard)   Pulse 90   Temp 98.2 °F (36.8 °C) (Temporal)   Ht 5' 10" (1.778 m)   Wt 70.5 kg (155 lb 6.4 oz)   SpO2 97%   BMI 22.30 kg/m²     Physical Exam  Vitals reviewed. Constitutional:       General: He is not in acute distress. Appearance: Normal appearance. He is not ill-appearing, toxic-appearing or diaphoretic. Cardiovascular:      Rate and Rhythm: Normal rate and regular rhythm. Pulses: Normal pulses. Heart sounds: Normal heart sounds. No murmur heard. Pulmonary:      Effort: Pulmonary effort is normal. No respiratory distress. Breath sounds: Normal breath sounds. Abdominal:      General: Abdomen is flat.    Musculoskeletal:         General: Tenderness present. No swelling, deformity or signs of injury. Comments: Tightness in the hamstring  Tenderness over the bilateral greater trochanter and down the IT band  Pain with flexion, rotation, extension flexion of the lumbar spine   Skin:     General: Skin is warm and dry. Capillary Refill: Capillary refill takes less than 2 seconds. Coloration: Skin is not jaundiced. Neurological:      General: No focal deficit present. Mental Status: He is alert.    Psychiatric:         Mood and Affect: Mood normal.          Duane Isabel MD   [id number] [facility]

## 2023-12-12 NOTE — PATIENT INSTRUCTIONS
Wellness Visit for Adults   AMBULATORY CARE:   A wellness visit  is when you see your healthcare provider to get screened for health problems. Your healthcare provider will also give you advice on how to stay healthy. Write down your questions so you remember to ask them. Ask your healthcare provider how often you should have a wellness visit. What happens at a wellness visit:  Your healthcare provider will ask about your health, and your family history of health problems. This includes high blood pressure, heart disease, and cancer. He or she will ask if you have symptoms that concern you, if you smoke, and about your mood. You may also be asked about your intake of medicines, supplements, food, and alcohol. Any of the following may be done: Your weight  will be checked. Your height may also be checked so your body mass index (BMI) can be calculated. Your BMI shows if you are at a healthy weight. Your blood pressure  and heart rate will be checked. Your temperature may also be checked. Blood and urine tests  may be done. Blood tests may be done to check your cholesterol levels. Abnormal cholesterol levels increase your risk for heart disease and stroke. You may also need a blood or urine test to check for diabetes if you are at increased risk. Urine tests may be done to look for signs of an infection or kidney disease. A physical exam  includes checking your heartbeat and lungs with a stethoscope. Your healthcare provider may also check your skin to look for sun damage. Screening tests  may be recommended. A screening test is done to check for diseases that may not cause symptoms. The screening tests you may need depend on your age, gender, family history, and lifestyle habits. For example, colorectal screening may be recommended if you are 48years old or older. Screening tests you need if you are a woman:   A Pap smear  is used to screen for cervical cancer.  Pap smears are usually done every 3 to 5 years depending on your age. You may need them more often if you have had abnormal Pap smear test results in the past. Ask your healthcare provider how often you should have a Pap smear. A mammogram  is an x-ray of your breasts to screen for breast cancer. Experts recommend mammograms every 2 years starting at age 48 years. You may need a mammogram at age 52 years or younger if you have an increased risk for breast cancer. Talk to your healthcare provider about when you should start having mammograms and how often you need them. Vaccines you may need:   Get an influenza vaccine  every year. The influenza vaccine protects you from the flu. Several types of viruses cause the flu. The viruses change over time, so new vaccines are made each year. Get a tetanus-diphtheria (Td) booster vaccine  every 10 years. This vaccine protects you against tetanus and diphtheria. Tetanus is a severe infection that may cause painful muscle spasms and lockjaw. Diphtheria is a severe bacterial infection that causes a thick covering in the back of your mouth and throat. Get a human papillomavirus (HPV) vaccine  if you are female and aged 23 to 32 or male 23 to 24 and never received it. This vaccine protects you from HPV infection. HPV is the most common infection spread by sexual contact. HPV may also cause vaginal, penile, and anal cancers. Get a pneumococcal vaccine  if you are aged 72 years or older. The pneumococcal vaccine is an injection given to protect you from pneumococcal disease. Pneumococcal disease is an infection caused by pneumococcal bacteria. The infection may cause pneumonia, meningitis, or an ear infection. Get a shingles vaccine  if you are 60 or older, even if you have had shingles before. The shingles vaccine is an injection to protect you from the varicella-zoster virus. This is the same virus that causes chickenpox.  Shingles is a painful rash that develops in people who had chickenpox or have been exposed to the virus. How to eat healthy:  My Plate is a model for planning healthy meals. It shows the types and amounts of foods that should go on your plate. Fruits and vegetables make up about half of your plate, and grains and protein make up the other half. A serving of dairy is included on the side of your plate. The amount of calories and serving sizes you need depends on your age, gender, weight, and height. Examples of healthy foods are listed below:  Eat a variety of vegetables  such as dark green, red, and orange vegetables. You can also include canned vegetables low in sodium (salt) and frozen vegetables without added butter or sauces. Eat a variety of fresh fruits , canned fruit in 100% juice, frozen fruit, and dried fruit. Include whole grains. At least half of the grains you eat should be whole grains. Examples include whole-wheat bread, wheat pasta, brown rice, and whole-grain cereals such as oatmeal.    Eat a variety of protein foods such as seafood (fish and shellfish), lean meat, and poultry without skin (turkey and chicken). Examples of lean meats include pork leg, shoulder, or tenderloin, and beef round, sirloin, tenderloin, and extra lean ground beef. Other protein foods include eggs and egg substitutes, beans, peas, soy products, nuts, and seeds. Choose low-fat dairy products such as skim or 1% milk or low-fat yogurt, cheese, and cottage cheese. Limit unhealthy fats  such as butter, hard margarine, and shortening. Exercise:  Exercise at least 30 minutes per day on most days of the week. Some examples of exercise include walking, biking, dancing, and swimming. You can also fit in more physical activity by taking the stairs instead of the elevator or parking farther away from stores. Include muscle strengthening activities 2 days each week. Regular exercise provides many health benefits.  It helps you manage your weight, and decreases your risk for type 2 diabetes, heart disease, stroke, and high blood pressure. Exercise can also help improve your mood. Ask your healthcare provider about the best exercise plan for you. General health and safety guidelines:   Do not smoke. Nicotine and other chemicals in cigarettes and cigars can cause lung damage. Ask your healthcare provider for information if you currently smoke and need help to quit. E-cigarettes or smokeless tobacco still contain nicotine. Talk to your healthcare provider before you use these products. Limit alcohol. A drink of alcohol is 12 ounces of beer, 5 ounces of wine, or 1½ ounces of liquor. Lose weight, if needed. Being overweight increases your risk of certain health conditions. These include heart disease, high blood pressure, type 2 diabetes, and certain types of cancer. Protect your skin. Do not sunbathe or use tanning beds. Use sunscreen with a SPF 15 or higher. Apply sunscreen at least 15 minutes before you go outside. Reapply sunscreen every 2 hours. Wear protective clothing, hats, and sunglasses when you are outside. Drive safely. Always wear your seatbelt. Make sure everyone in your car wears a seatbelt. A seatbelt can save your life if you are in an accident. Do not use your cell phone when you are driving. This could distract you and cause an accident. Pull over if you need to make a call or send a text message. Practice safe sex. Use latex condoms if are sexually active and have more than one partner. Your healthcare provider may recommend screening tests for sexually transmitted infections (STIs). Wear helmets, lifejackets, and protective gear. Always wear a helmet when you ride a bike or motorcycle, go skiing, or play sports that could cause a head injury. Wear protective equipment when you play sports. Wear a lifejacket when you are on a boat or doing water sports.     © Copyright [employer] 2023 Information is for End User's use only and may not be sold, redistributed or otherwise used for commercial purposes. The above information is an  only. It is not intended as medical advice for individual conditions or treatments. Talk to your doctor, nurse or pharmacist before following any medical regimen to see if it is safe and effective for you.

## 2024-02-05 ENCOUNTER — OFFICE VISIT (OUTPATIENT)
Dept: FAMILY MEDICINE CLINIC | Facility: CLINIC | Age: 24
End: 2024-02-05
Payer: COMMERCIAL

## 2024-02-05 VITALS
OXYGEN SATURATION: 97 % | BODY MASS INDEX: 22.96 KG/M2 | HEART RATE: 97 BPM | WEIGHT: 160.4 LBS | HEIGHT: 70 IN | TEMPERATURE: 98.3 F | SYSTOLIC BLOOD PRESSURE: 122 MMHG | DIASTOLIC BLOOD PRESSURE: 72 MMHG

## 2024-02-05 DIAGNOSIS — F31.31 BIPOLAR AFFECTIVE DISORDER, CURRENTLY DEPRESSED, MILD (HCC): ICD-10-CM

## 2024-02-05 DIAGNOSIS — F41.1 GENERALIZED ANXIETY DISORDER: ICD-10-CM

## 2024-02-05 DIAGNOSIS — Z83.79 FAMILY HISTORY OF CHRONIC PANCREATITIS: ICD-10-CM

## 2024-02-05 DIAGNOSIS — F99 INSOMNIA DUE TO OTHER MENTAL DISORDER: ICD-10-CM

## 2024-02-05 DIAGNOSIS — F51.05 INSOMNIA DUE TO OTHER MENTAL DISORDER: ICD-10-CM

## 2024-02-05 DIAGNOSIS — R10.10 PAIN OF UPPER ABDOMEN: Primary | ICD-10-CM

## 2024-02-05 DIAGNOSIS — R19.7 DIARRHEA, UNSPECIFIED TYPE: ICD-10-CM

## 2024-02-05 DIAGNOSIS — F31.9 BIPOLAR AFFECTIVE DISORDER (HCC): ICD-10-CM

## 2024-02-05 PROCEDURE — 99214 OFFICE O/P EST MOD 30 MIN: CPT | Performed by: FAMILY MEDICINE

## 2024-02-05 RX ORDER — FLUOXETINE HYDROCHLORIDE 40 MG/1
40 CAPSULE ORAL DAILY
Qty: 30 CAPSULE | Refills: 1 | Status: SHIPPED | OUTPATIENT
Start: 2024-02-05

## 2024-02-05 RX ORDER — OLANZAPINE 10 MG/1
10 TABLET ORAL
Qty: 30 TABLET | Refills: 1 | Status: SHIPPED | OUTPATIENT
Start: 2024-02-05

## 2024-02-05 NOTE — PROGRESS NOTES
Assessment/Plan:    1. Pain of upper abdomen  -     Comprehensive metabolic panel; Future  -     CBC and differential; Future  -     Lipase; Future  -     Stool Enteric Bacterial Panel by PCR; Future  -     Clostridium difficile toxin by PCR with EIA; Future  -     Ova and parasite examination; Future  -     Giardia antigen; Future  -     US abdomen complete; Future; Expected date: 02/05/2024    2. Diarrhea, unspecified type  -     Comprehensive metabolic panel; Future  -     CBC and differential; Future  -     Lipase; Future  -     Stool Enteric Bacterial Panel by PCR; Future  -     Clostridium difficile toxin by PCR with EIA; Future  -     Ova and parasite examination; Future  -     Giardia antigen; Future  -     US abdomen complete; Future; Expected date: 02/05/2024    3. Family history of chronic pancreatitis  -     Comprehensive metabolic panel; Future  -     CBC and differential; Future  -     Lipase; Future  -     Stool Enteric Bacterial Panel by PCR; Future  -     Clostridium difficile toxin by PCR with EIA; Future  -     Ova and parasite examination; Future  -     Giardia antigen; Future  -     US abdomen complete; Future; Expected date: 02/05/2024    4. Bipolar affective disorder, currently depressed, mild (HCC)    5. Bipolar affective disorder (HCC)  -     FLUoxetine (PROzac) 40 MG capsule; Take 1 capsule (40 mg total) by mouth in the morning  -     OLANZapine (ZyPREXA) 10 mg tablet; Take 1 tablet (10 mg total) by mouth daily at bedtime    6. Generalized anxiety disorder  -     FLUoxetine (PROzac) 40 MG capsule; Take 1 capsule (40 mg total) by mouth in the morning  -     OLANZapine (ZyPREXA) 10 mg tablet; Take 1 tablet (10 mg total) by mouth daily at bedtime    7. Insomnia due to other mental disorder  -     FLUoxetine (PROzac) 40 MG capsule; Take 1 capsule (40 mg total) by mouth in the morning        Patient Instructions   Stool studies since sx lingering > 2 weeks.   Check labs given fam h/o  pancreatitis and other GI issues  Increase fluoxetine to 40mg and increase olanzapine to 10mg, can do this separately to see which helps  Take melatonin for about a month before desired effect but only after settling on new dose of olanzapine.     Return if symptoms worsen or fail to improve.    Subjective:      Patient ID: Rakesh Peña is a 23 y.o. male.    Chief Complaint   Patient presents with    Abdominal Pain    Nausea     Abdominal pain and nausea for about 3 days, diarrhea for about a month, no normal bowel movements in a month    Diarrhea       Here today for stomach pain and nausea x  Friday. Also notes diarrhea for the past month. In the last year and a half he notes having diarrhea on and off. Has tried imodium which was unhelpful. Has not seen a provider for this previously. Notes he is frequently ill and feels like he has fevers during this time. Denies vomiting. Reports he has about 5 episodes a day since the past month. Notes his stool contains mucous and is a normal brown color. Last couple days he has not really had an appetite. Reports he has actually gained weight despite this. Asks about anxiety as well. Trouble sleeping. already on prozac 20mg and zyprexa      Abdominal Pain  Associated symptoms include diarrhea and nausea. Pertinent negatives include no arthralgias, dysuria, fever, headaches, sore throat or vomiting.   Nausea  Associated symptoms include abdominal pain, chills, diaphoresis and nausea. Pertinent negatives include no arthralgias, chest pain, coughing, fatigue, fever, headaches, joint swelling, sore throat or vomiting.   Diarrhea   Associated symptoms include abdominal pain and chills. Pertinent negatives include no arthralgias, coughing, fever, headaches or vomiting.       The following portions of the patient's history were reviewed and updated as appropriate: allergies, current medications, past family history, past medical history, past social history, past surgical  history and problem list.    Review of Systems   Constitutional:  Positive for activity change, appetite change, chills and diaphoresis. Negative for fatigue and fever.   HENT:  Negative for ear pain and sore throat.    Eyes:  Negative for visual disturbance.   Respiratory:  Negative for cough, chest tightness and shortness of breath.    Cardiovascular:  Negative for chest pain, palpitations and leg swelling.   Gastrointestinal:  Positive for abdominal pain, diarrhea, nausea and rectal pain (notes shooting pain every once in a while). Negative for blood in stool and vomiting.   Genitourinary:  Negative for dysuria.   Musculoskeletal:  Positive for back pain (has been evaluated for this). Negative for arthralgias and joint swelling.   Neurological:  Positive for dizziness and light-headedness. Negative for syncope and headaches.   Hematological:  Negative for adenopathy. Does not bruise/bleed easily.   Psychiatric/Behavioral:  Negative for confusion and sleep disturbance.          Current Outpatient Medications   Medication Sig Dispense Refill    FLUoxetine (PROzac) 40 MG capsule Take 1 capsule (40 mg total) by mouth in the morning 30 capsule 1    OLANZapine (ZyPREXA) 10 mg tablet Take 1 tablet (10 mg total) by mouth daily at bedtime 30 tablet 1    Advair Diskus 100-50 MCG/ACT inhaler INHALE 1 PUFF BY MOUTH AND INTO THE LUNGS TWICE A DAY RINSE MOUTH AFTER USE (Patient not taking: Reported on 2/5/2024) 60 blister 0    albuterol (Ventolin HFA) 90 mcg/act inhaler Inhale 2 puffs every 6 (six) hours as needed for wheezing or shortness of breath (Patient not taking: Reported on 2/5/2024) 6.7 g 1    cyclobenzaprine (FLEXERIL) 5 mg tablet Take 0.5-1 tablets (2.5-5 mg total) by mouth daily at bedtime (Patient not taking: Reported on 2/5/2024) 30 tablet 0    famotidine (PEPCID) 20 mg tablet Take 1 tablet (20 mg total) by mouth 2 (two) times a day (Patient not taking: Reported on 2/5/2024) 30 tablet 1    loratadine (CLARITIN)  "10 mg tablet Take 1 tablet (10 mg total) by mouth daily (Patient not taking: Reported on 2/5/2024) 30 tablet 0    ondansetron (ZOFRAN) 4 mg tablet Take 1 tablet (4 mg total) by mouth every 8 (eight) hours as needed for nausea or vomiting (Patient not taking: Reported on 2/5/2024) 20 tablet 0     No current facility-administered medications for this visit.       Objective:    /72 (BP Location: Right arm, Patient Position: Sitting, Cuff Size: Standard)   Pulse 97   Temp 98.3 °F (36.8 °C) (Temporal)   Ht 5' 10\" (1.778 m)   Wt 72.8 kg (160 lb 6.4 oz)   SpO2 97%   BMI 23.02 kg/m²        Physical Exam  Constitutional:       General: He is not in acute distress.     Appearance: He is well-developed. He is not toxic-appearing.   Cardiovascular:      Rate and Rhythm: Normal rate and regular rhythm.      Heart sounds: Normal heart sounds.   Pulmonary:      Effort: Pulmonary effort is normal.      Breath sounds: Normal breath sounds. No wheezing.   Abdominal:      General: Abdomen is flat. Bowel sounds are normal. There is no distension.      Palpations: There is no hepatomegaly.      Tenderness: There is abdominal tenderness in the right upper quadrant and left upper quadrant. Negative signs include Bates's sign.   Neurological:      Mental Status: He is alert.                Dayanara Ashley MD  "

## 2024-02-15 ENCOUNTER — APPOINTMENT (OUTPATIENT)
Dept: LAB | Facility: MEDICAL CENTER | Age: 24
End: 2024-02-15
Payer: COMMERCIAL

## 2024-02-15 DIAGNOSIS — Z83.79 FAMILY HISTORY OF CHRONIC PANCREATITIS: ICD-10-CM

## 2024-02-15 DIAGNOSIS — M54.16 LUMBAR BACK PAIN WITH RADICULOPATHY AFFECTING LOWER EXTREMITY: ICD-10-CM

## 2024-02-15 DIAGNOSIS — M25.552 BILATERAL HIP PAIN: ICD-10-CM

## 2024-02-15 DIAGNOSIS — R10.10 PAIN OF UPPER ABDOMEN: ICD-10-CM

## 2024-02-15 DIAGNOSIS — R19.7 DIARRHEA, UNSPECIFIED TYPE: ICD-10-CM

## 2024-02-15 DIAGNOSIS — M25.551 BILATERAL HIP PAIN: ICD-10-CM

## 2024-02-15 LAB
ALBUMIN SERPL BCP-MCNC: 4.4 G/DL (ref 3.5–5)
ALP SERPL-CCNC: 38 U/L (ref 34–104)
ALT SERPL W P-5'-P-CCNC: 16 U/L (ref 7–52)
ANION GAP SERPL CALCULATED.3IONS-SCNC: 7 MMOL/L
AST SERPL W P-5'-P-CCNC: 13 U/L (ref 13–39)
BASOPHILS # BLD AUTO: 0.03 THOUSANDS/ÂΜL (ref 0–0.1)
BASOPHILS NFR BLD AUTO: 1 % (ref 0–1)
BILIRUB SERPL-MCNC: 0.26 MG/DL (ref 0.2–1)
BUN SERPL-MCNC: 16 MG/DL (ref 5–25)
CALCIUM SERPL-MCNC: 9 MG/DL (ref 8.4–10.2)
CHLORIDE SERPL-SCNC: 103 MMOL/L (ref 96–108)
CO2 SERPL-SCNC: 31 MMOL/L (ref 21–32)
CREAT SERPL-MCNC: 0.73 MG/DL (ref 0.6–1.3)
CRP SERPL QL: <1 MG/L
EOSINOPHIL # BLD AUTO: 0.46 THOUSAND/ÂΜL (ref 0–0.61)
EOSINOPHIL NFR BLD AUTO: 8 % (ref 0–6)
ERYTHROCYTE [DISTWIDTH] IN BLOOD BY AUTOMATED COUNT: 11.8 % (ref 11.6–15.1)
ERYTHROCYTE [SEDIMENTATION RATE] IN BLOOD: 2 MM/HOUR (ref 0–14)
GFR SERPL CREATININE-BSD FRML MDRD: 131 ML/MIN/1.73SQ M
GLUCOSE P FAST SERPL-MCNC: 87 MG/DL (ref 65–99)
HCT VFR BLD AUTO: 44.3 % (ref 36.5–49.3)
HGB BLD-MCNC: 14.1 G/DL (ref 12–17)
IMM GRANULOCYTES # BLD AUTO: 0.03 THOUSAND/UL (ref 0–0.2)
IMM GRANULOCYTES NFR BLD AUTO: 1 % (ref 0–2)
LIPASE SERPL-CCNC: 15 U/L (ref 11–82)
LYMPHOCYTES # BLD AUTO: 1.88 THOUSANDS/ÂΜL (ref 0.6–4.47)
LYMPHOCYTES NFR BLD AUTO: 33 % (ref 14–44)
MCH RBC QN AUTO: 30.5 PG (ref 26.8–34.3)
MCHC RBC AUTO-ENTMCNC: 31.8 G/DL (ref 31.4–37.4)
MCV RBC AUTO: 96 FL (ref 82–98)
MONOCYTES # BLD AUTO: 0.47 THOUSAND/ÂΜL (ref 0.17–1.22)
MONOCYTES NFR BLD AUTO: 8 % (ref 4–12)
NEUTROPHILS # BLD AUTO: 2.78 THOUSANDS/ÂΜL (ref 1.85–7.62)
NEUTS SEG NFR BLD AUTO: 49 % (ref 43–75)
NRBC BLD AUTO-RTO: 0 /100 WBCS
PLATELET # BLD AUTO: 322 THOUSANDS/UL (ref 149–390)
PMV BLD AUTO: 9.6 FL (ref 8.9–12.7)
POTASSIUM SERPL-SCNC: 4 MMOL/L (ref 3.5–5.3)
PROT SERPL-MCNC: 6.4 G/DL (ref 6.4–8.4)
RBC # BLD AUTO: 4.63 MILLION/UL (ref 3.88–5.62)
SODIUM SERPL-SCNC: 141 MMOL/L (ref 135–147)
WBC # BLD AUTO: 5.65 THOUSAND/UL (ref 4.31–10.16)

## 2024-02-15 PROCEDURE — 85652 RBC SED RATE AUTOMATED: CPT

## 2024-02-15 PROCEDURE — 85025 COMPLETE CBC W/AUTO DIFF WBC: CPT

## 2024-02-15 PROCEDURE — 86140 C-REACTIVE PROTEIN: CPT

## 2024-02-15 PROCEDURE — 36415 COLL VENOUS BLD VENIPUNCTURE: CPT

## 2024-02-15 PROCEDURE — 83690 ASSAY OF LIPASE: CPT

## 2024-02-15 PROCEDURE — 80053 COMPREHEN METABOLIC PANEL: CPT

## 2024-02-23 NOTE — PATIENT INSTRUCTIONS
Addended by: PAULINA ZHAO on: 2/23/2024 04:00 PM     Modules accepted: Orders     Stool studies since sx lingering > 2 weeks.   Check labs given fam h/o pancreatitis and other GI issues  Increase fluoxetine to 40mg and increase olanzapine to 10mg, can do this separately to see which helps  Take melatonin for about a month before desired effect but only after settling on new dose of olanzapine.

## 2024-08-07 ENCOUNTER — APPOINTMENT (OUTPATIENT)
Dept: LAB | Facility: MEDICAL CENTER | Age: 24
End: 2024-08-07

## 2024-10-29 ENCOUNTER — OFFICE VISIT (OUTPATIENT)
Dept: FAMILY MEDICINE CLINIC | Facility: CLINIC | Age: 24
End: 2024-10-29
Payer: COMMERCIAL

## 2024-10-29 VITALS
SYSTOLIC BLOOD PRESSURE: 118 MMHG | BODY MASS INDEX: 26.86 KG/M2 | HEIGHT: 70 IN | TEMPERATURE: 98.4 F | WEIGHT: 187.6 LBS | HEART RATE: 106 BPM | DIASTOLIC BLOOD PRESSURE: 64 MMHG

## 2024-10-29 DIAGNOSIS — K08.89 PAIN, DENTAL: Primary | ICD-10-CM

## 2024-10-29 PROCEDURE — 99213 OFFICE O/P EST LOW 20 MIN: CPT | Performed by: INTERNAL MEDICINE

## 2024-10-29 RX ORDER — AMOXICILLIN 500 MG/1
500 CAPSULE ORAL EVERY 8 HOURS SCHEDULED
Qty: 30 CAPSULE | Refills: 0 | Status: SHIPPED | OUTPATIENT
Start: 2024-10-29 | End: 2024-11-08

## 2024-10-29 NOTE — PROGRESS NOTES
Ambulatory Visit  Name: Rakesh Peña      : 2000      MRN: [id number]  Encounter Provider: Jackelyn Newman MD  Encounter Date: 10/29/2024   Encounter department: [facility]    Assessment & Plan  Pain, dental  Has not been to a dentist for a while and has progressively been having more problems with his teeth.  He finally did get an appointment but it is not an till a couple weeks in the future.  Send he says he just he cannot stand the pain we will try treating him with amoxicillin    Orders:    amoxicillin (AMOXIL) 500 mg capsule; Take 1 capsule (500 mg total) by mouth every 8 (eight) hours for 10 days       History of Present Illness     Dental Pain   This is a chronic problem. The current episode started 1 to 4 weeks ago. The problem occurs daily. The problem has been gradually worsening. The pain is at a severity of 7/10. The pain is moderate. Associated symptoms include thermal sensitivity. Pertinent negatives include no difficulty swallowing, facial pain, fever, oral bleeding or sinus pressure. He has tried acetaminophen and NSAIDs for the symptoms. The treatment provided mild relief.         Review of Systems   Constitutional:  Negative for chills and fever.   HENT:  Positive for dental problem (Cavities multiple). Negative for ear pain, facial swelling, sinus pressure, sore throat and trouble swallowing.    Eyes:  Negative for pain and visual disturbance.   Respiratory:  Negative for cough and shortness of breath.    Cardiovascular:  Negative for chest pain and palpitations.   Gastrointestinal:  Negative for abdominal pain and vomiting.   Genitourinary:  Negative for dysuria and hematuria.   Musculoskeletal:  Negative for arthralgias and back pain.   Skin:  Negative for color change and rash.   Neurological:  Negative for seizures and syncope.   All other systems reviewed and are negative.          Objective     /64 (BP Location: Right arm, Patient Position: Sitting, Cuff Size:  "Standard)   Pulse (!) 106   Temp 98.4 °F (36.9 °C) (Temporal)   Ht 5' 10\" (1.778 m)   Wt 85.1 kg (187 lb 9.6 oz)   BMI 26.92 kg/m²     Physical Exam  Vitals and nursing note reviewed.   Constitutional:       General: He is not in acute distress.     Appearance: He is well-developed.   HENT:      Head: Normocephalic and atraumatic.      Mouth/Throat:      Mouth: Mucous membranes are moist.      Comments: Multiple cavities therese molars  Eyes:      Conjunctiva/sclera: Conjunctivae normal.   Cardiovascular:      Rate and Rhythm: Normal rate and regular rhythm.      Heart sounds: No murmur heard.  Pulmonary:      Effort: Pulmonary effort is normal. No respiratory distress.      Breath sounds: Normal breath sounds.   Abdominal:      Palpations: Abdomen is soft.      Tenderness: There is no abdominal tenderness.   Musculoskeletal:         General: No swelling.      Cervical back: Neck supple.   Skin:     General: Skin is warm and dry.      Capillary Refill: Capillary refill takes less than 2 seconds.   Neurological:      General: No focal deficit present.      Mental Status: He is alert.   Psychiatric:         Mood and Affect: Mood normal.         "

## 2024-10-29 NOTE — ASSESSMENT & PLAN NOTE
Has not been to a dentist for a while and has progressively been having more problems with his teeth.  He finally did get an appointment but it is not an till a couple weeks in the future.  Send he says he just he cannot stand the pain we will try treating him with amoxicillin    Orders:    amoxicillin (AMOXIL) 500 mg capsule; Take 1 capsule (500 mg total) by mouth every 8 (eight) hours for 10 days

## 2025-01-20 ENCOUNTER — TELEPHONE (OUTPATIENT)
Age: 25
End: 2025-01-20

## 2025-01-20 DIAGNOSIS — F31.61 BIPOLAR DISORDER, CURRENT EPISODE MIXED, MILD (HCC): Primary | ICD-10-CM

## 2025-01-20 NOTE — TELEPHONE ENCOUNTER
Patient is calling in requesting orders for labs. He states he is due for labs and goes to [facility]    Please let him know once labs are entered

## 2025-01-24 ENCOUNTER — APPOINTMENT (OUTPATIENT)
Dept: LAB | Facility: MEDICAL CENTER | Age: 25
End: 2025-01-24
Payer: COMMERCIAL

## 2025-01-24 DIAGNOSIS — F31.61 BIPOLAR DISORDER, CURRENT EPISODE MIXED, MILD (HCC): ICD-10-CM

## 2025-01-24 DIAGNOSIS — F31.9 BIPOLAR AFFECTIVE DISORDER, REMISSION STATUS UNSPECIFIED (HCC): ICD-10-CM

## 2025-01-24 LAB
ALBUMIN SERPL BCG-MCNC: 4.7 G/DL (ref 3.5–5)
ALP SERPL-CCNC: 50 U/L (ref 34–104)
ALT SERPL W P-5'-P-CCNC: 22 U/L (ref 7–52)
ANION GAP SERPL CALCULATED.3IONS-SCNC: 7 MMOL/L (ref 4–13)
AST SERPL W P-5'-P-CCNC: 19 U/L (ref 13–39)
BASOPHILS # BLD AUTO: 0.03 THOUSANDS/ΜL (ref 0–0.1)
BASOPHILS NFR BLD AUTO: 1 % (ref 0–1)
BILIRUB SERPL-MCNC: 0.39 MG/DL (ref 0.2–1)
BUN SERPL-MCNC: 19 MG/DL (ref 5–25)
CALCIUM SERPL-MCNC: 9.6 MG/DL (ref 8.4–10.2)
CHLORIDE SERPL-SCNC: 102 MMOL/L (ref 96–108)
CHOLEST SERPL-MCNC: 209 MG/DL (ref ?–200)
CO2 SERPL-SCNC: 31 MMOL/L (ref 21–32)
CREAT SERPL-MCNC: 0.7 MG/DL (ref 0.6–1.3)
EOSINOPHIL # BLD AUTO: 0.13 THOUSAND/ΜL (ref 0–0.61)
EOSINOPHIL NFR BLD AUTO: 2 % (ref 0–6)
ERYTHROCYTE [DISTWIDTH] IN BLOOD BY AUTOMATED COUNT: 12.1 % (ref 11.6–15.1)
EST. AVERAGE GLUCOSE BLD GHB EST-MCNC: 103 MG/DL
GFR SERPL CREATININE-BSD FRML MDRD: 133 ML/MIN/1.73SQ M
GLUCOSE P FAST SERPL-MCNC: 110 MG/DL (ref 65–99)
HBA1C MFR BLD: 5.2 %
HCT VFR BLD AUTO: 43 % (ref 36.5–49.3)
HDLC SERPL-MCNC: 67 MG/DL
HGB BLD-MCNC: 14.3 G/DL (ref 12–17)
IMM GRANULOCYTES # BLD AUTO: 0.02 THOUSAND/UL (ref 0–0.2)
IMM GRANULOCYTES NFR BLD AUTO: 0 % (ref 0–2)
LDLC SERPL CALC-MCNC: 130 MG/DL (ref 0–100)
LYMPHOCYTES # BLD AUTO: 1.34 THOUSANDS/ΜL (ref 0.6–4.47)
LYMPHOCYTES NFR BLD AUTO: 25 % (ref 14–44)
MCH RBC QN AUTO: 30 PG (ref 26.8–34.3)
MCHC RBC AUTO-ENTMCNC: 33.3 G/DL (ref 31.4–37.4)
MCV RBC AUTO: 90 FL (ref 82–98)
MONOCYTES # BLD AUTO: 0.45 THOUSAND/ΜL (ref 0.17–1.22)
MONOCYTES NFR BLD AUTO: 8 % (ref 4–12)
NEUTROPHILS # BLD AUTO: 3.45 THOUSANDS/ΜL (ref 1.85–7.62)
NEUTS SEG NFR BLD AUTO: 64 % (ref 43–75)
NONHDLC SERPL-MCNC: 142 MG/DL
NRBC BLD AUTO-RTO: 0 /100 WBCS
PLATELET # BLD AUTO: 369 THOUSANDS/UL (ref 149–390)
PMV BLD AUTO: 9.2 FL (ref 8.9–12.7)
POTASSIUM SERPL-SCNC: 3.9 MMOL/L (ref 3.5–5.3)
PROT SERPL-MCNC: 7.8 G/DL (ref 6.4–8.4)
RBC # BLD AUTO: 4.77 MILLION/UL (ref 3.88–5.62)
SODIUM SERPL-SCNC: 140 MMOL/L (ref 135–147)
T4 FREE SERPL-MCNC: 0.85 NG/DL (ref 0.61–1.12)
TRIGL SERPL-MCNC: 61 MG/DL (ref ?–150)
TSH SERPL DL<=0.05 MIU/L-ACNC: 1.94 UIU/ML (ref 0.45–4.5)
WBC # BLD AUTO: 5.42 THOUSAND/UL (ref 4.31–10.16)

## 2025-01-24 PROCEDURE — 36415 COLL VENOUS BLD VENIPUNCTURE: CPT

## 2025-01-24 PROCEDURE — 83036 HEMOGLOBIN GLYCOSYLATED A1C: CPT

## 2025-01-24 PROCEDURE — 80053 COMPREHEN METABOLIC PANEL: CPT

## 2025-01-24 PROCEDURE — 85025 COMPLETE CBC W/AUTO DIFF WBC: CPT

## 2025-01-24 PROCEDURE — 84443 ASSAY THYROID STIM HORMONE: CPT

## 2025-01-24 PROCEDURE — 80061 LIPID PANEL: CPT

## 2025-01-24 PROCEDURE — 84439 ASSAY OF FREE THYROXINE: CPT

## 2025-01-29 ENCOUNTER — RESULTS FOLLOW-UP (OUTPATIENT)
Dept: FAMILY MEDICINE CLINIC | Facility: CLINIC | Age: 25
End: 2025-01-29

## 2025-01-30 ENCOUNTER — OFFICE VISIT (OUTPATIENT)
Dept: FAMILY MEDICINE CLINIC | Facility: CLINIC | Age: 25
End: 2025-01-30
Payer: COMMERCIAL

## 2025-01-30 VITALS
HEART RATE: 97 BPM | WEIGHT: 185.8 LBS | OXYGEN SATURATION: 98 % | HEIGHT: 70 IN | BODY MASS INDEX: 26.6 KG/M2 | TEMPERATURE: 98.3 F | DIASTOLIC BLOOD PRESSURE: 74 MMHG | SYSTOLIC BLOOD PRESSURE: 122 MMHG

## 2025-01-30 DIAGNOSIS — E66.3 OVERWEIGHT (BMI 25.0-29.9): ICD-10-CM

## 2025-01-30 DIAGNOSIS — F31.77 BIPOLAR DISORDER, IN PARTIAL REMISSION, MOST RECENT EPISODE MIXED (HCC): Primary | ICD-10-CM

## 2025-01-30 PROBLEM — J45.909 REACTIVE AIRWAY DISEASE WITHOUT COMPLICATION: Status: RESOLVED | Noted: 2023-09-06 | Resolved: 2025-01-30

## 2025-01-30 PROCEDURE — 99214 OFFICE O/P EST MOD 30 MIN: CPT | Performed by: FAMILY MEDICINE

## 2025-01-30 RX ORDER — CHLORPROMAZINE HYDROCHLORIDE 25 MG/1
25 TABLET, FILM COATED ORAL
Start: 2025-01-30

## 2025-01-30 RX ORDER — CHLORPROMAZINE HYDROCHLORIDE 25 MG/1
TABLET, FILM COATED ORAL
COMMUNITY
Start: 2025-01-22 | End: 2025-01-30 | Stop reason: ALTCHOICE

## 2025-01-30 NOTE — PROGRESS NOTES
"Name: Rakesh Peña      : 2000      MRN: [id number]  Encounter Provider: Dayanara Ashley MD  Encounter Date: 2025   Encounter department: [facility]  :  Assessment & Plan  Bipolar disorder, in partial remission, most recent episode mixed (HCC)  [facility] get meds [facility]  Orders:    chlorproMAZINE (THORAZINE) 25 mg tablet; Take 1 tablet (25 mg total) by mouth daily at bedtime as needed for nausea    Overweight (BMI 25.0-29.9)         reduce carbs (bread rice potato pasta), watch portions, more fruits and veggies, continue exercise efforts         History of Present Illness   Here for med check, lab review. [facility] seeing [facility] in [place], as well as a counselor. Bipolar has been stable. Working at the diner right now, but wants to start HVAC program. BP controlled. A1C normal despite glucose 110. Has since been trying to eat better. Watching weight with the olanzapine. Has gained about 40#. Working out and skating more, eating better.       Review of Systems   Constitutional:  Negative for fatigue and fever.   HENT:  Negative for congestion.    Eyes:  Negative for visual disturbance.   Respiratory:  Negative for chest tightness and shortness of breath.    Cardiovascular:  Negative for chest pain and palpitations.   Gastrointestinal:  Negative for abdominal pain.   Genitourinary:  Negative for difficulty urinating.   Musculoskeletal:  Negative for arthralgias.   Neurological:  Negative for headaches.   Hematological:  Does not bruise/bleed easily.       Objective   /74 (BP Location: Right arm, Patient Position: Sitting, Cuff Size: Large)   Pulse 97   Temp 98.3 °F (36.8 °C) (Temporal)   Ht 5' 10\" (1.778 m)   Wt 84.3 kg (185 lb 12.8 oz)   SpO2 98%   BMI 26.66 kg/m²      Physical Exam  Vitals reviewed.   Constitutional:       Appearance: Normal appearance.   Cardiovascular:      Rate and Rhythm: Normal rate and regular rhythm.      Heart sounds: " Normal heart sounds.   Pulmonary:      Effort: Pulmonary effort is normal.      Breath sounds: Normal breath sounds.   Musculoskeletal:      Right lower leg: No edema.      Left lower leg: No edema.   Neurological:      General: No focal deficit present.      Mental Status: He is alert and oriented to person, place, and time.   Psychiatric:         Mood and Affect: Mood normal.         Behavior: Behavior normal.         Thought Content: Thought content normal.         Judgment: Judgment normal.

## 2025-01-30 NOTE — ASSESSMENT & PLAN NOTE
[facility] to get meds [facility]  Orders:    chlorproMAZINE (THORAZINE) 25 mg tablet; Take 1 tablet (25 mg total) by mouth daily at bedtime as needed for nausea

## 2025-03-19 ENCOUNTER — OFFICE VISIT (OUTPATIENT)
Dept: FAMILY MEDICINE CLINIC | Facility: CLINIC | Age: 25
End: 2025-03-19
Payer: COMMERCIAL

## 2025-03-19 VITALS
OXYGEN SATURATION: 98 % | HEART RATE: 99 BPM | DIASTOLIC BLOOD PRESSURE: 80 MMHG | WEIGHT: 181.2 LBS | SYSTOLIC BLOOD PRESSURE: 122 MMHG | BODY MASS INDEX: 25.94 KG/M2 | TEMPERATURE: 98 F | HEIGHT: 70 IN

## 2025-03-19 DIAGNOSIS — R19.7 DIARRHEA, UNSPECIFIED TYPE: Primary | ICD-10-CM

## 2025-03-19 DIAGNOSIS — Z83.79 FAMILY HISTORY OF INFLAMMATORY BOWEL DISEASE: ICD-10-CM

## 2025-03-19 DIAGNOSIS — B07.8 OTHER VIRAL WARTS: ICD-10-CM

## 2025-03-19 PROCEDURE — 99214 OFFICE O/P EST MOD 30 MIN: CPT | Performed by: FAMILY MEDICINE

## 2025-03-19 NOTE — ASSESSMENT & PLAN NOTE
Referral to GI due to family history of Crohn's disease and IBS. Discussed increasing dietary fiber in the meantime.  Orders:    Ambulatory Referral to Gastroenterology; Future

## 2025-03-19 NOTE — PROGRESS NOTES
"Name: Rakesh Peña      : 2000      MRN: [id number]  Encounter Provider: Dayanara Ashley MD  Encounter Date: 3/19/2025   Encounter department: [facility]  :  Assessment & Plan  Diarrhea, unspecified type  Referral to GI due to family history of Crohn's disease and IBS. Discussed increasing dietary fiber in the meantime.  Orders:    Ambulatory Referral to Gastroenterology; Future    Family history of inflammatory bowel disease  Referral to GI.  Orders:    Ambulatory Referral to Gastroenterology; Future    Other viral warts  Over the counter methods such as Compound W and freeze away discussed.               History of Present Illness   5-6 months noticed a bump on the right foot near the ankle. Only painful if shoe rubs for a long time. Not impeding movement of foot. No pain at rest. No redness, fever, pus drainage. History of warts on fingers, patient reports that this bump looks similar to those warts. No family history for skin cancer. Reports that he has had diarrhea for a year. 5-6 episodes a day. Feels fatigued often. Drinks a lot of fluids. Has not tried anything like fiber for this issue. Would like referral to GI. Anxiety makes diarrhea worse. Foods like salad, grapefruit cause diarrhea. No food intolerances. Family history of IBD.    Diarrhea   Pertinent negatives include no abdominal pain, fever or vomiting.     Review of Systems   Constitutional:  Negative for fatigue and fever.   Gastrointestinal:  Positive for diarrhea. Negative for abdominal distention, abdominal pain, blood in stool, constipation, nausea, rectal pain and vomiting.   Genitourinary:  Negative for genital sores.   Skin:  Negative for color change, pallor, rash and wound.   Hematological:  Does not bruise/bleed easily.       Objective   /80 (BP Location: Right arm, Patient Position: Sitting, Cuff Size: Standard)   Pulse 99   Temp 98 °F (36.7 °C) (Temporal)   Ht 5' 10\" (1.778 m)   Wt 82.2 kg (181 lb " 3.2 oz)   SpO2 98%   BMI 26.00 kg/m²      Physical Exam  Constitutional:       General: He is not in acute distress.     Appearance: Normal appearance. He is not ill-appearing.   Cardiovascular:      Rate and Rhythm: Normal rate and regular rhythm.   Pulmonary:      Effort: Pulmonary effort is normal.      Breath sounds: Normal breath sounds.   Abdominal:      General: Abdomen is flat. Bowel sounds are normal. There is no distension.      Palpations: Abdomen is soft.      Tenderness: There is no abdominal tenderness.   Musculoskeletal:        Feet:    Feet:      Right foot:      Skin integrity: No ulcer, blister, skin breakdown, erythema or warmth.      Left foot:      Skin integrity: Skin integrity normal.      Comments: Verrucous wart inferior to lateral mallelous  Skin:     General: Skin is warm and dry.   Neurological:      Mental Status: He is alert.

## 2025-04-17 ENCOUNTER — OFFICE VISIT (OUTPATIENT)
Dept: FAMILY MEDICINE CLINIC | Facility: CLINIC | Age: 25
End: 2025-04-17
Payer: COMMERCIAL

## 2025-04-17 VITALS
WEIGHT: 175.6 LBS | SYSTOLIC BLOOD PRESSURE: 128 MMHG | HEIGHT: 70 IN | HEART RATE: 106 BPM | BODY MASS INDEX: 25.14 KG/M2 | DIASTOLIC BLOOD PRESSURE: 88 MMHG | TEMPERATURE: 97.4 F | OXYGEN SATURATION: 98 %

## 2025-04-17 DIAGNOSIS — F99 INSOMNIA DUE TO OTHER MENTAL DISORDER: Primary | ICD-10-CM

## 2025-04-17 DIAGNOSIS — F31.9 BIPOLAR AFFECTIVE DISORDER (HCC): ICD-10-CM

## 2025-04-17 DIAGNOSIS — F41.1 GENERALIZED ANXIETY DISORDER: ICD-10-CM

## 2025-04-17 DIAGNOSIS — F51.05 INSOMNIA DUE TO OTHER MENTAL DISORDER: Primary | ICD-10-CM

## 2025-04-17 PROCEDURE — 99214 OFFICE O/P EST MOD 30 MIN: CPT | Performed by: FAMILY MEDICINE

## 2025-04-17 RX ORDER — OLANZAPINE 10 MG/1
10 TABLET, FILM COATED ORAL
Qty: 30 TABLET | Refills: 0 | Status: SHIPPED | OUTPATIENT
Start: 2025-04-17

## 2025-04-17 RX ORDER — FLUOXETINE HYDROCHLORIDE 40 MG/1
40 CAPSULE ORAL DAILY
COMMUNITY

## 2025-04-17 NOTE — PROGRESS NOTES
"Name: Rakesh Peña      : 2000      MRN: [id number]  Encounter Provider: Jaiden Gonzales MD  Encounter Date: 2025   Encounter department: [facility]    Assessment & Plan  Insomnia due to other mental disorder  Due to baseline issue with generalized anxiety disorder, will have patient continue his current medications  Discussed sleep hygiene and non pharmaceutical help with sleep  Will have patient try melatonin extended release to help with staying asleep       Bipolar affective disorder (HCC)    Orders:    OLANZapine (ZyPREXA) 10 mg tablet; Take 1 tablet (10 mg total) by mouth daily at bedtime    Generalized anxiety disorder  For intolerance obtain for prescription, patient may use GoodRx which may save him some money when refilling medication  Orders:    OLANZapine (ZyPREXA) 10 mg tablet; Take 1 tablet (10 mg total) by mouth daily at bedtime         History of Present Illness       HPI    Patient is a 24-year-old male patient here to discuss his sleeping problem.  Patient does have a history of insomnia in the past, was taking Thorazine however this caused medication is no longer effective for the patient.  He also has history of bipolar disorder and generalized anxiety for which she takes Prozac 40 mg daily and olanzapine 10 mg at bedtime.    Patient is almost out of olanzapine, reports medication because about $100 a month for him to  and he is waiting for his paycheck to pay for this medication.    Patient reported issue is his sleep schedule, due to his working odd hours he does not have a consistent sleep schedule.  Occasionally he will work from 2 AM to 10 AM and sometimes he will work 8 AM to 9 PM.    He gets about 4 to 5 hours of sleep and then wakes up for various reasons.  Patient denies any issue falling asleep main issues staying asleep.    \"I've been talking to a girl\"    Review of Systems   Constitutional:  Negative for chills and fever.   HENT:  Negative for " congestion, rhinorrhea and sore throat.    Respiratory:  Negative for chest tightness (improving) and shortness of breath.    Cardiovascular:  Negative for chest pain.   Gastrointestinal:  Negative for abdominal pain.   Neurological:  Negative for dizziness, light-headedness and headaches.   Psychiatric/Behavioral:  Positive for sleep disturbance. The patient is nervous/anxious (hit or miss). The patient is not hyperactive.          Past Medical History:   Diagnosis Date    No known health problems      Past Surgical History:   Procedure Laterality Date    CYST REMOVAL Bilateral     on tailbone     Family History   Problem Relation Age of Onset    Cancer Mother         thyroid    No Known Problems Father     Breast cancer Family     Cancer Family         Malignant neoplasm of Gastrointestinal tract    Kidney cancer Family      Social History     Tobacco Use    Smoking status: Never    Smokeless tobacco: Never   Vaping Use    Vaping status: Former    Substances: Nicotine, Flavoring   Substance and Sexual Activity    Alcohol use: No    Drug use: Not Currently     Types: Marijuana    Sexual activity: Not on file     Current Outpatient Medications on File Prior to Visit   Medication Sig    FLUoxetine (PROzac) 40 MG capsule Take 40 mg by mouth daily    [DISCONTINUED] FLUoxetine (PROzac) 40 MG capsule Take 1 capsule (40 mg total) by mouth in the morning    [DISCONTINUED] OLANZapine (ZyPREXA) 10 mg tablet Take 1 tablet (10 mg total) by mouth daily at bedtime    [DISCONTINUED] chlorproMAZINE (THORAZINE) 25 mg tablet Take 1 tablet (25 mg total) by mouth daily at bedtime as needed for nausea (Patient not taking: Reported on 4/17/2025)     No Known Allergies  Immunization History   Administered Date(s) Administered    DTP 08/09/2002, 09/23/2002, 01/07/2003, 06/10/2003, 08/22/2005    Hep B, adult 2000, 09/02/2002, 06/10/2003    Hib (PRP-OMP) 2000, 08/09/2002, 09/23/2002    IPV 2000, 09/02/2002, 01/07/2003,  "08/22/2005    MMR 08/09/2002, 08/22/2005    Meningococcal, Unknown Serogroups 11/25/2013    Pneumococcal Conjugate PCV 7 2001, 09/23/2002, 01/07/2003    Tdap 11/25/2013    Varicella 09/06/2005, 09/01/2011     Objective   /88 (BP Location: Right arm, Patient Position: Sitting, Cuff Size: Standard)   Pulse (!) 106   Temp (!) 97.4 °F (36.3 °C)   Ht 5' 10\" (1.778 m)   Wt 79.7 kg (175 lb 9.6 oz)   SpO2 98%   BMI 25.20 kg/m²     Physical Exam  Vitals reviewed.   Constitutional:       General: He is not in acute distress.     Appearance: Normal appearance. He is not ill-appearing, toxic-appearing or diaphoretic.   Cardiovascular:      Rate and Rhythm: Normal rate and regular rhythm.      Pulses: Normal pulses.      Heart sounds: Normal heart sounds. No murmur heard.  Pulmonary:      Effort: Pulmonary effort is normal. No respiratory distress.      Breath sounds: Normal breath sounds. No stridor. No wheezing or rhonchi.   Abdominal:      General: Abdomen is flat. Bowel sounds are normal. There is no distension.      Palpations: Abdomen is soft.   Musculoskeletal:         General: No swelling or deformity.   Skin:     General: Skin is warm and dry.      Capillary Refill: Capillary refill takes less than 2 seconds.      Coloration: Skin is not jaundiced.   Neurological:      General: No focal deficit present.      Mental Status: He is alert.   Psychiatric:         Mood and Affect: Mood normal.             Jaiden Gonzales M.D.  Family Medicine    Please excuse any \"sound-alike\" errors that may have ocurred during the process of dictation. Parts of this note have been dictated and there may be errors present in the transcription process. Thank you.    "

## 2025-04-17 NOTE — ASSESSMENT & PLAN NOTE
Orders:    OLANZapine (ZyPREXA) 10 mg tablet; Take 1 tablet (10 mg total) by mouth daily at bedtime

## 2025-04-17 NOTE — ASSESSMENT & PLAN NOTE
For intolerance obtain for prescription, patient may use GoodRx which may save him some money when refilling medication  Orders:    OLANZapine (ZyPREXA) 10 mg tablet; Take 1 tablet (10 mg total) by mouth daily at bedtime

## 2025-06-09 ENCOUNTER — OFFICE VISIT (OUTPATIENT)
Dept: URGENT CARE | Facility: MEDICAL CENTER | Age: 25
End: 2025-06-09
Payer: COMMERCIAL

## 2025-06-09 VITALS
SYSTOLIC BLOOD PRESSURE: 112 MMHG | HEART RATE: 85 BPM | BODY MASS INDEX: 25.18 KG/M2 | TEMPERATURE: 98.3 F | WEIGHT: 175.5 LBS | OXYGEN SATURATION: 99 % | RESPIRATION RATE: 16 BRPM | DIASTOLIC BLOOD PRESSURE: 74 MMHG

## 2025-06-09 DIAGNOSIS — R30.0 DYSURIA: Primary | ICD-10-CM

## 2025-06-09 LAB
SL AMB  POCT GLUCOSE, UA: ABNORMAL
SL AMB LEUKOCYTE ESTERASE,UA: ABNORMAL
SL AMB POCT BILIRUBIN,UA: ABNORMAL
SL AMB POCT BLOOD,UA: ABNORMAL
SL AMB POCT CLARITY,UA: ABNORMAL
SL AMB POCT COLOR,UA: ABNORMAL
SL AMB POCT KETONES,UA: 160
SL AMB POCT NITRITE,UA: ABNORMAL
SL AMB POCT PH,UA: 6
SL AMB POCT SPECIFIC GRAVITY,UA: 1.03
SL AMB POCT URINE PROTEIN: ABNORMAL
SL AMB POCT UROBILINOGEN: 0.2

## 2025-06-09 PROCEDURE — G0383 LEV 4 HOSP TYPE B ED VISIT: HCPCS | Performed by: PHYSICIAN ASSISTANT

## 2025-06-09 PROCEDURE — S9083 URGENT CARE CENTER GLOBAL: HCPCS | Performed by: PHYSICIAN ASSISTANT

## 2025-06-09 PROCEDURE — 87086 URINE CULTURE/COLONY COUNT: CPT | Performed by: PHYSICIAN ASSISTANT

## 2025-06-09 PROCEDURE — 81002 URINALYSIS NONAUTO W/O SCOPE: CPT | Performed by: PHYSICIAN ASSISTANT

## 2025-06-09 RX ORDER — CIPROFLOXACIN 500 MG/1
500 TABLET, FILM COATED ORAL EVERY 12 HOURS SCHEDULED
Qty: 14 TABLET | Refills: 0 | Status: SHIPPED | OUTPATIENT
Start: 2025-06-09 | End: 2025-06-16

## 2025-06-09 NOTE — PROGRESS NOTES
[facility]        NAME: Rakesh Peña is a 24 y.o. male  : 2000    MRN: [id number]  DATE: 2025  TIME: 3:37 PM    Assessment and Plan   Dysuria [R30.0]  1. Dysuria  POCT urine dip    Urine culture    Urine culture    ciprofloxacin (CIPRO) 500 mg tablet            Patient Instructions     Dysuria  Cipro twice daily  Follow-up with urologist  Follow up with PCP in 3-5 days.  Proceed to  ER if symptoms worsen.    Chief Complaint     Chief Complaint   Patient presents with    Abdominal Pain     Sharp pain above groin area and testicles. Burning when urinating. 1.5-2 weeks. No constipation, or diarrhea., some bloating, no nausea or vomiting. Not currently sexually active. No fever or chills.          History of Present Illness       24-year-old male who presents complaining of frequency, urgency, dysuria, suprapubic pressure.  Patient denies any penile discharge.  States that he has never been sexually active and is not concerned about STD.  Denies fever, chills, nausea, vomiting.    Abdominal Pain  Associated symptoms include dysuria.       Review of Systems   Review of Systems   Gastrointestinal:  Positive for abdominal pain.   Genitourinary:  Positive for dysuria.         Current Medications     Current Medications[1]    Current Allergies     Allergies as of 2025    (No Known Allergies)            The following portions of the patient's history were reviewed and updated as appropriate: allergies, current medications, past family history, past medical history, past social history, past surgical history and problem list.     Past Medical History[2]    Past Surgical History[3]    Family History[4]      Medications have been verified.        Objective   /74   Pulse 85   Temp 98.3 °F (36.8 °C)   Resp 16   Wt 79.6 kg (175 lb 8 oz)   SpO2 99%   BMI 25.18 kg/m²        Physical Exam     Physical Exam  Constitutional:       General: He is not in acute distress.     Appearance: He is  well-developed. He is not diaphoretic.     Cardiovascular:      Rate and Rhythm: Normal rate and regular rhythm.      Heart sounds: Normal heart sounds.   Pulmonary:      Effort: Pulmonary effort is normal. No respiratory distress.      Breath sounds: Normal breath sounds. No wheezing or rales.   Chest:      Chest wall: No tenderness.   Abdominal:      General: Bowel sounds are normal.      Palpations: Abdomen is soft.      Tenderness: There is abdominal tenderness in the suprapubic area. There is no right CVA tenderness, left CVA tenderness, guarding or rebound. Negative signs include Bates's sign, McBurney's sign and psoas sign.     Musculoskeletal:      Cervical back: Normal range of motion and neck supple.   Lymphadenopathy:      Cervical: No cervical adenopathy.                        [1]   Current Outpatient Medications:     ciprofloxacin (CIPRO) 500 mg tablet, Take 1 tablet (500 mg total) by mouth every 12 (twelve) hours for 7 days, Disp: 14 tablet, Rfl: 0    FLUoxetine (PROzac) 40 MG capsule, Take 40 mg by mouth in the morning., Disp: , Rfl:     OLANZapine (ZyPREXA) 10 mg tablet, Take 1 tablet (10 mg total) by mouth daily at bedtime, Disp: 30 tablet, Rfl: 0  [2]   Past Medical History:  Diagnosis Date    No known health problems    [3]   Past Surgical History:  Procedure Laterality Date    CYST REMOVAL Bilateral     on tailbone   [4]   Family History  Problem Relation Name Age of Onset    Cancer Mother          thyroid    No Known Problems Father      Breast cancer Family      Cancer Family          Malignant neoplasm of Gastrointestinal tract    Kidney cancer Family

## 2025-06-09 NOTE — PATIENT INSTRUCTIONS
Dysuria  Cipro twice daily  Follow-up with urologist  Follow up with PCP in 3-5 days.  Proceed to  ER if symptoms worsen.

## 2025-06-10 LAB — BACTERIA UR CULT: NORMAL

## 2025-07-30 ENCOUNTER — OFFICE VISIT (OUTPATIENT)
Dept: FAMILY MEDICINE CLINIC | Facility: CLINIC | Age: 25
End: 2025-07-30
Payer: COMMERCIAL

## 2025-07-30 VITALS
SYSTOLIC BLOOD PRESSURE: 108 MMHG | OXYGEN SATURATION: 98 % | HEART RATE: 84 BPM | DIASTOLIC BLOOD PRESSURE: 74 MMHG | HEIGHT: 70 IN | WEIGHT: 178 LBS | BODY MASS INDEX: 25.48 KG/M2 | TEMPERATURE: 97.9 F

## 2025-07-30 DIAGNOSIS — L23.7 POISON IVY DERMATITIS: Primary | ICD-10-CM

## 2025-07-30 PROCEDURE — 99213 OFFICE O/P EST LOW 20 MIN: CPT | Performed by: FAMILY MEDICINE

## 2025-07-30 RX ORDER — PREDNISONE 20 MG/1
40 TABLET ORAL DAILY
Qty: 10 TABLET | Refills: 0 | Status: SHIPPED | OUTPATIENT
Start: 2025-07-30 | End: 2025-08-04